# Patient Record
Sex: MALE | Race: WHITE | NOT HISPANIC OR LATINO | Employment: FULL TIME | URBAN - METROPOLITAN AREA
[De-identification: names, ages, dates, MRNs, and addresses within clinical notes are randomized per-mention and may not be internally consistent; named-entity substitution may affect disease eponyms.]

---

## 2018-03-21 ENCOUNTER — OFFICE VISIT (OUTPATIENT)
Dept: FAMILY MEDICINE CLINIC | Facility: CLINIC | Age: 63
End: 2018-03-21

## 2018-03-21 VITALS
OXYGEN SATURATION: 99 % | HEIGHT: 70 IN | BODY MASS INDEX: 33.93 KG/M2 | SYSTOLIC BLOOD PRESSURE: 120 MMHG | WEIGHT: 237 LBS | HEART RATE: 71 BPM | DIASTOLIC BLOOD PRESSURE: 82 MMHG

## 2018-03-21 DIAGNOSIS — Z00.00 HEALTHCARE MAINTENANCE: ICD-10-CM

## 2018-03-21 DIAGNOSIS — Z91.89 AT HIGH RISK FOR INFECTION: ICD-10-CM

## 2018-03-21 DIAGNOSIS — R31.21 ASYMPTOMATIC MICROSCOPIC HEMATURIA: ICD-10-CM

## 2018-03-21 DIAGNOSIS — Z00.00 HEALTHCARE MAINTENANCE: Primary | ICD-10-CM

## 2018-03-21 PROBLEM — K63.5 BENIGN COLON POLYP: Status: ACTIVE | Noted: 2018-03-21

## 2018-03-21 PROBLEM — G47.33 OBSTRUCTIVE SLEEP APNEA ON CPAP: Status: ACTIVE | Noted: 2018-03-21

## 2018-03-21 PROBLEM — R09.89 BRUIT OF LEFT CAROTID ARTERY: Status: ACTIVE | Noted: 2018-03-21

## 2018-03-21 PROBLEM — K21.9 CHRONIC GERD: Status: ACTIVE | Noted: 2018-03-21

## 2018-03-21 PROBLEM — Z99.89 OBSTRUCTIVE SLEEP APNEA ON CPAP: Status: ACTIVE | Noted: 2018-03-21

## 2018-03-21 PROCEDURE — 99386 PREV VISIT NEW AGE 40-64: CPT | Performed by: FAMILY MEDICINE

## 2018-03-21 NOTE — PROGRESS NOTES
Subjective   Hiram Echevarria is a 62 y.o. male.     Comes in to establish care for preventive health maintenance.  A significant orthopedic surgical history with knee back bilateral carpal tunnel and shoulder surgeries.  No acute complaints.  Review of his records following visit showed diagnosis of DOREEN using CPAP.  Family history of stroke.  Chronic microscopic hematuria with a negative  workup.  Does like periodic PSA screenings.  Had a colonoscopy in 2013 with benign polyp and due for repeat in December.  Tetanus is up-to-date.  Doesn't look like he's been screened for hepatitis C.           The following portions of the patient's history were reviewed and updated as appropriate: allergies, current medications, past family history, past medical history, past social history, past surgical history and problem list.    Review of Systems   Constitutional: Negative.  Negative for chills and fever.   HENT: Negative.  Negative for congestion, rhinorrhea and sore throat.    Eyes: Negative.  Negative for discharge and visual disturbance.   Respiratory: Negative.  Negative for cough and shortness of breath.    Cardiovascular: Negative.  Negative for chest pain.   Gastrointestinal: Negative.  Negative for abdominal pain, constipation, diarrhea, nausea and vomiting.   Endocrine: Negative.  Negative for polydipsia.   Genitourinary: Negative.  Negative for dysuria and hematuria.   Musculoskeletal: Negative.  Negative for arthralgias and myalgias.   Skin: Negative.  Negative for rash.   Allergic/Immunologic: Negative.    Neurological: Negative.  Negative for weakness, numbness and headaches.   Hematological: Negative.  Does not bruise/bleed easily.   Psychiatric/Behavioral: Negative.  Negative for dysphoric mood. The patient is not nervous/anxious.    All other systems reviewed and are negative.        Objective   Physical Exam   Constitutional: He is oriented to person, place, and time. He appears well-developed and  well-nourished.   HENT:   Head: Normocephalic and atraumatic.   Right Ear: External ear normal.   Left Ear: External ear normal.   Mouth/Throat: No oropharyngeal exudate.   Eyes: Conjunctivae, EOM and lids are normal. Pupils are equal, round, and reactive to light. Right eye exhibits no discharge. Left eye exhibits no discharge. No scleral icterus.   Neck: Trachea normal, normal range of motion and phonation normal. Neck supple. Carotid bruit is not present. No thyromegaly present.   Cardiovascular: Normal rate, regular rhythm and normal heart sounds.  Exam reveals no gallop and no friction rub.    No murmur heard.  No peripheral edema   Pulmonary/Chest: Effort normal and breath sounds normal. No stridor. He has no wheezes. He has no rales.   Abdominal: Soft. He exhibits no distension. There is no tenderness.   Musculoskeletal: Normal range of motion. He exhibits no edema.   Lymphadenopathy:     He has no cervical adenopathy.   Neurological: He is alert and oriented to person, place, and time. He has normal strength. No cranial nerve deficit.   Skin: Skin is warm, dry and intact. No petechiae and no rash noted. No cyanosis. Nails show no clubbing.   Psychiatric: He has a normal mood and affect. His speech is normal and behavior is normal. Judgment and thought content normal. Cognition and memory are normal.   Nursing note and vitals reviewed.    Available records in Epic are reviewed:  Colonoscopy confirmed, but profiles found with LDLs ranging near 100, street of DOREEN on CPAP which needs to be reviewed with patient; tetanus is current.    Assessment/Plan   Hiram was seen today for establish care.    Diagnoses and all orders for this visit:    Healthcare maintenance  -     CBC & Differential; Future  -     Comprehensive Metabolic Panel; Future  -     Lipid Panel; Future  -     PSA Screen; Future    Asymptomatic microscopic hematuria  -     Comprehensive Metabolic Panel; Future  -     Urinalysis With / Microscopic If  Indicated - Urine, Clean Catch; Future    At high risk for infection  -     CBC & Differential; Future  -     Hepatitis C Antibody; Future      Annual follow-up.  Review ten-year cardiovascular risk status with him and labs are available.  Need to discuss DOREEN

## 2018-03-21 NOTE — PATIENT INSTRUCTIONS
Read YOUNGER NEXT YEAR    See if you can get records of colonoscopy    We will communicate with you about labs    I would strongly encourage you to sign up for My Chart using the access code provided with these papers.  This provides an excellent convenient way for you to communicate with us and with [facility].  Lab and x-ray reports can be viewed, prescription refills and appointments may be requested, and you may send emails to your physician's office.      Keep working on the diet and exercise

## 2018-03-24 LAB
ALBUMIN SERPL-MCNC: 4.7 G/DL (ref 3.5–5.2)
ALBUMIN/GLOB SERPL: 2 G/DL
ALP SERPL-CCNC: 82 U/L (ref 39–117)
ALT SERPL-CCNC: 38 U/L (ref 1–41)
APPEARANCE UR: CLEAR
AST SERPL-CCNC: 41 U/L (ref 1–40)
BACTERIA #/AREA URNS HPF: ABNORMAL /HPF
BASOPHILS # BLD AUTO: 0.03 10*3/MM3 (ref 0–0.2)
BASOPHILS NFR BLD AUTO: 0.9 % (ref 0–1.5)
BILIRUB SERPL-MCNC: 1.1 MG/DL (ref 0.1–1.2)
BILIRUB UR QL STRIP: NEGATIVE
BUN SERPL-MCNC: 18 MG/DL (ref 8–23)
BUN/CREAT SERPL: 16.5 (ref 7–25)
CALCIUM SERPL-MCNC: 9.6 MG/DL (ref 8.6–10.5)
CASTS URNS MICRO: ABNORMAL
CHLORIDE SERPL-SCNC: 103 MMOL/L (ref 98–107)
CHOLEST SERPL-MCNC: 150 MG/DL (ref 0–200)
CO2 SERPL-SCNC: 26.6 MMOL/L (ref 22–29)
COLOR UR: YELLOW
CREAT SERPL-MCNC: 1.09 MG/DL (ref 0.76–1.27)
EOSINOPHIL # BLD AUTO: 0.11 10*3/MM3 (ref 0–0.7)
EOSINOPHIL NFR BLD AUTO: 3.1 % (ref 0.3–6.2)
EPI CELLS #/AREA URNS HPF: ABNORMAL /HPF
ERYTHROCYTE [DISTWIDTH] IN BLOOD BY AUTOMATED COUNT: 12.8 % (ref 11.5–14.5)
GFR SERPLBLD CREATININE-BSD FMLA CKD-EPI: 69 ML/MIN/1.73
GFR SERPLBLD CREATININE-BSD FMLA CKD-EPI: 84 ML/MIN/1.73
GLOBULIN SER CALC-MCNC: 2.3 GM/DL
GLUCOSE SERPL-MCNC: 103 MG/DL (ref 65–99)
GLUCOSE UR QL: NEGATIVE
HCT VFR BLD AUTO: 43.1 % (ref 40.4–52.2)
HCV AB S/CO SERPL IA: 0.2 S/CO RATIO (ref 0–0.9)
HDLC SERPL-MCNC: 40 MG/DL (ref 40–60)
HGB BLD-MCNC: 14.1 G/DL (ref 13.7–17.6)
HGB UR QL STRIP: ABNORMAL
IMM GRANULOCYTES # BLD: 0 10*3/MM3 (ref 0–0.03)
IMM GRANULOCYTES NFR BLD: 0 % (ref 0–0.5)
KETONES UR QL STRIP: NEGATIVE
LDLC SERPL CALC-MCNC: 98 MG/DL (ref 0–100)
LEUKOCYTE ESTERASE UR QL STRIP: NEGATIVE
LYMPHOCYTES # BLD AUTO: 0.94 10*3/MM3 (ref 0.9–4.8)
LYMPHOCYTES NFR BLD AUTO: 26.8 % (ref 19.6–45.3)
MCH RBC QN AUTO: 32.2 PG (ref 27–32.7)
MCHC RBC AUTO-ENTMCNC: 32.7 G/DL (ref 32.6–36.4)
MCV RBC AUTO: 98.4 FL (ref 79.8–96.2)
MONOCYTES # BLD AUTO: 0.42 10*3/MM3 (ref 0.2–1.2)
MONOCYTES NFR BLD AUTO: 12 % (ref 5–12)
NEUTROPHILS # BLD AUTO: 2.01 10*3/MM3 (ref 1.9–8.1)
NEUTROPHILS NFR BLD AUTO: 57.2 % (ref 42.7–76)
NITRITE UR QL STRIP: NEGATIVE
PH UR STRIP: 7 [PH] (ref 5–8)
PLATELET # BLD AUTO: 208 10*3/MM3 (ref 140–500)
POTASSIUM SERPL-SCNC: 4.7 MMOL/L (ref 3.5–5.2)
PROT SERPL-MCNC: 7 G/DL (ref 6–8.5)
PROT UR QL STRIP: NEGATIVE
PSA SERPL-MCNC: 0.47 NG/ML (ref 0–4)
RBC # BLD AUTO: 4.38 10*6/MM3 (ref 4.6–6)
RBC #/AREA URNS HPF: ABNORMAL /HPF
SODIUM SERPL-SCNC: 141 MMOL/L (ref 136–145)
SP GR UR: 1.02 (ref 1–1.03)
TRIGL SERPL-MCNC: 58 MG/DL (ref 0–150)
UROBILINOGEN UR STRIP-MCNC: ABNORMAL MG/DL
VLDLC SERPL CALC-MCNC: 11.6 MG/DL (ref 5–40)
WBC # BLD AUTO: 3.51 10*3/MM3 (ref 4.5–10.7)
WBC #/AREA URNS HPF: ABNORMAL /HPF

## 2018-03-28 DIAGNOSIS — R73.9 HYPERGLYCEMIA: ICD-10-CM

## 2018-03-28 DIAGNOSIS — D70.9 NEUTROPENIA, UNSPECIFIED TYPE (HCC): Primary | ICD-10-CM

## 2018-07-16 ENCOUNTER — TRANSCRIBE ORDERS (OUTPATIENT)
Dept: ADMINISTRATIVE | Facility: HOSPITAL | Age: 63
End: 2018-07-16

## 2018-07-16 DIAGNOSIS — M25.562 LEFT KNEE PAIN, UNSPECIFIED CHRONICITY: Primary | ICD-10-CM

## 2018-07-20 ENCOUNTER — HOSPITAL ENCOUNTER (OUTPATIENT)
Dept: MRI IMAGING | Facility: HOSPITAL | Age: 63
Discharge: HOME | End: 2018-07-20
Attending: ORTHOPAEDIC SURGERY | Admitting: ORTHOPAEDIC SURGERY

## 2018-07-20 DIAGNOSIS — M25.562 LEFT KNEE PAIN, UNSPECIFIED CHRONICITY: ICD-10-CM

## 2018-07-20 PROCEDURE — 73721 MRI JNT OF LWR EXTRE W/O DYE: CPT

## 2018-07-24 ENCOUNTER — TRANSCRIBE ORDERS (OUTPATIENT)
Dept: PHYSICAL THERAPY | Facility: CLINIC | Age: 63
End: 2018-07-24

## 2018-07-24 DIAGNOSIS — M25.562 ACUTE PAIN OF LEFT KNEE: Primary | ICD-10-CM

## 2018-08-10 ENCOUNTER — TREATMENT (OUTPATIENT)
Dept: PHYSICAL THERAPY | Facility: CLINIC | Age: 63
End: 2018-08-10

## 2018-08-10 DIAGNOSIS — Z09 SURGERY FOLLOW-UP: ICD-10-CM

## 2018-08-10 DIAGNOSIS — M17.12 PRIMARY OSTEOARTHRITIS OF LEFT KNEE: ICD-10-CM

## 2018-08-10 DIAGNOSIS — M25.562 ACUTE PAIN OF LEFT KNEE: Primary | ICD-10-CM

## 2018-08-10 PROCEDURE — 97110 THERAPEUTIC EXERCISES: CPT | Performed by: PHYSICAL THERAPIST

## 2018-08-10 PROCEDURE — G8979 MOBILITY GOAL STATUS: HCPCS | Performed by: PHYSICAL THERAPIST

## 2018-08-10 PROCEDURE — G8978 MOBILITY CURRENT STATUS: HCPCS | Performed by: PHYSICAL THERAPIST

## 2018-08-10 PROCEDURE — 97161 PT EVAL LOW COMPLEX 20 MIN: CPT | Performed by: PHYSICAL THERAPIST

## 2018-08-10 PROCEDURE — 97014 ELECTRIC STIMULATION THERAPY: CPT | Performed by: PHYSICAL THERAPIST

## 2018-08-10 NOTE — PROGRESS NOTES
Physical Therapy Initial Evaluation and Plan of Care    Patient: Hiram Echevarria Jr.   : 1955  Diagnosis/ICD-10 Code:  Acute pain of left knee [M25.562]  Referring practitioner: Chuck Merrill MD  Date of Initial Visit: 8/10/2018  Today's Date: 8/10/2018  Patient seen for 1 sessions           Subjective Questionnaire: LEFS: 54/80      Subjective Evaluation    History of Present Illness  Date of surgery: 8/3/2018  Mechanism of injury: Pt goes by Kuldeep. L knee menisectomy 8/3/18. Pt reports knee pain began about a year ago when getting off plane. Had several cortisone injections prior to surgery. No previous PT.   Currently, knee is sore. Has been back to work since this past Monday. Works manual labor. Has not taken pain medication since Monday.   No disturbances in sleep because of knee.  Pt reports mild pain with stairs, getting in/out of car, and prolonged standing.  Using ice daily.  Pt walks and works around the house to stay active.  Pt reports he injured knee as a child (no significant findings). No other injuries or surgeries.  Hx of R shoulder replacement, R knee scope, and lumbar disectomy.      Patient Occupation: [employer] Pain  Current pain ratin  At worst pain ratin  Location: L inferior patella  Quality: dull ache and sharp  Relieving factors: ice  Aggravating factors: squatting, ambulation, stairs and standing  Progression: improved    Social Support  Lives in: multiple-level home  Lives with: spouse    Diagnostic Tests  Abnormal MRI: pre-op.    Treatments  Previous treatment: injection treatment  Current treatment: physical therapy  Patient Goals  Patient goals for therapy: decreased pain, increased strength, independence with ADLs/IADLs and return to work             Objective       Tenderness   Left Knee   Tenderness in the inferior patella, lateral joint line and medial joint line.     Active Range of Motion   Left Knee   Flexion: 100 degrees   Extension: 5 degrees      Strength/Myotome Testing     Left Knee   Flexion: 5  Extension: 5  Quadriceps contraction: fair    Ambulation     Observational Gait   Gait: antalgic          Assessment & Plan     Assessment  Impairments: abnormal gait, abnormal muscle tone, abnormal or restricted ROM, activity intolerance, impaired balance, impaired physical strength, lacks appropriate home exercise program, pain with function and weight-bearing intolerance  Assessment details: Pt will benefit from skilled PT services in order to address listed impairments and increase tolerance to normal daily activities including ADLs, work, and recreational activities.    Prognosis: good  Functional Limitations: lifting, walking, uncomfortable because of pain, moving in bed, sitting, standing and stooping  Goals  Plan Goals: Plan Goals: Short Term Goals: 2 weeks. Patient will:  1. Be independent with initial HEP  2. Be instructed in posture and body mechanics  3. Tolerate CKC knee strengthening w/o significant increase in pain    Long Term Goals: 4-8 weeks. Patient will:  1. Ambulate 100ft w/o significant pain, limitation, or deviation in gait mechanics  2. Ascend/descend 4 stairs using reciprocal steps and 1 rail safely  3. Perform body weight squat w/o significant knee valgus, demonstrating adequate strength for ADLs  4. Demonstrate normal lower extremity flexibility (0-110deg) to allow for walking/ADL's/performance of household tasks without pain.  5. Perform SL balance >5sec demonstrating balance required for navigating uneven terrain  6. Perceived disability </= 15% as measured on LEFS  7. Be independent with long term HEP    Plan  Therapy options: will be seen for skilled physical therapy services  Planned modality interventions: cryotherapy, microcurrent electrical stimulation, ultrasound and thermotherapy (hydrocollator packs)  Planned therapy interventions: abdominal trunk stabilization, balance/weight-bearing training, body mechanics training, ADL  retraining, flexibility, functional ROM exercises, gait training, home exercise program, joint mobilization, manual therapy, neuromuscular re-education, soft tissue mobilization, strengthening, stretching and therapeutic activities  Frequency: 2x week  Duration in weeks: 8  Treatment plan discussed with: patient        Manual Therapy:    0     mins  61095;  Therapeutic Exercise:    15     mins  78811;     Neuromuscular Rebeca:    0    mins  38251;    Therapeutic Activity:     0     mins  19372;     Gait Trainin     mins  97889;     Ultrasound:     0     mins  01251;    Electrical Stimulation:    15     mins  70168 ( );  Dry Needling     0     mins self-pay    Timed Treatment:   15   mins   Total Treatment:     60   mins    PT SIGNATURE: Norma Shore PT   DATE TREATMENT INITIATED: 8/10/2018    Initial Certification  Certification Period: 2018  I certify that the therapy services are furnished while this patient is under my care.  The services outlined above are required by this patient, and will be reviewed every 90 days.     PHYSICIAN: Chuck Merrill MD      DATE:     Please sign and return via fax to [phone].. Thank you, [facility].

## 2018-08-13 ENCOUNTER — OFFICE VISIT (OUTPATIENT)
Dept: PHYSICAL THERAPY | Facility: CLINIC | Age: 63
End: 2018-08-13

## 2018-08-13 DIAGNOSIS — Z09 SURGERY FOLLOW-UP: ICD-10-CM

## 2018-08-13 DIAGNOSIS — M25.562 ACUTE PAIN OF LEFT KNEE: Primary | ICD-10-CM

## 2018-08-13 DIAGNOSIS — M17.12 PRIMARY OSTEOARTHRITIS OF LEFT KNEE: ICD-10-CM

## 2018-08-13 PROCEDURE — 97014 ELECTRIC STIMULATION THERAPY: CPT | Performed by: PHYSICAL THERAPIST

## 2018-08-13 PROCEDURE — 97530 THERAPEUTIC ACTIVITIES: CPT | Performed by: PHYSICAL THERAPIST

## 2018-08-13 PROCEDURE — 97110 THERAPEUTIC EXERCISES: CPT | Performed by: PHYSICAL THERAPIST

## 2018-08-13 NOTE — PROGRESS NOTES
Physical Therapy Daily Progress Note    Time In 1520  Time Out 1610    Hiram Echevarria reports: just saw MD who thought left knee was doing well. Told to continue PT and only f/u with MD if problems occurred.  Reports up on left leg for prolonged time yesterday, so did have some increased discomfort but felt better with ice application.    Subjective     Objective   See Exercise, Manual, and Modality Logs for complete treatment.   Exercise rationale/ pain free exercise performance  Anatomy and structure of affected musculature  Sleeping positions with pillows  Alternate exercise positions  Verbal/Tactile cues to ensure correct exercise performance/technique  Encouraged ice application(frequent and often).    Added: 10 oclock slr, LAQ and seated hamstring curl with t band      Assessment/Plan  Subjectively reports improvement of left knee post surgical intervention as well as noting ability to return to more upright activities, though still limited with prolonged tasks(standing/walking) due to swelling/inflamation.  Able to progress strengthening exercise of affected musculature.   Will benefit from continued intervention and exercise progression to increase mm strength and function in order to allow for full return to prior ADL and functional activity.    Progress strengthening /stabilization /functional activity for L LE           Manual Therapy:         mins  52864;  Therapeutic Exercise:    15     mins  11360;     Neuromuscular Rebeca:        mins  32368;    Therapeutic Activity:      15    mins  42495;     Gait Training:           mins  39175;     Ultrasound:          mins  80190;    Electrical Stimulation:   15     mins  08613 ( );      Timed Treatment:  30    mins   Total Treatment:    45    mins    Waylon Tucker PTA    Physical Therapist Assistant [place] [phone]

## 2018-08-20 ENCOUNTER — TREATMENT (OUTPATIENT)
Dept: PHYSICAL THERAPY | Facility: CLINIC | Age: 63
End: 2018-08-20

## 2018-08-20 DIAGNOSIS — M17.12 PRIMARY OSTEOARTHRITIS OF LEFT KNEE: ICD-10-CM

## 2018-08-20 DIAGNOSIS — M25.562 ACUTE PAIN OF LEFT KNEE: Primary | ICD-10-CM

## 2018-08-20 DIAGNOSIS — Z09 SURGERY FOLLOW-UP: ICD-10-CM

## 2018-08-20 PROCEDURE — 97110 THERAPEUTIC EXERCISES: CPT | Performed by: PHYSICAL THERAPIST

## 2018-08-20 NOTE — PROGRESS NOTES
"Physical Therapy Daily Progress Note    Subjective   Pt reports he was weeding yesterday, stepped the wrong way, and \"jammed knee upwards\". (Demonstrates knee flexion.) Knee hurt the rest of the day. He's been on his feet most of today. Knee is still better than it was prior to surgery.    Objective   See Exercise, Manual, and Modality Logs for complete treatment.       Assessment/Plan  Required verbal cues to maintain TKE w/ SLR. Tolerated exercises well. Deferred e-stim due to clothing. Progress per POC.                 Manual Therapy:    0     mins  46468;  Therapeutic Exercise:    40     mins  46679;     Neuromuscular Rebeca:    0    mins  91938;    Therapeutic Activity:     0     mins  45564;     Gait Trainin     mins  57222;     Ultrasound:     0     mins  05471;    Work Hardening           0      mins 13843  Iontophoresis               0   mins 69987    Timed Treatment:   40   mins   Total Treatment:     55   mins    Norma Shore, PT  Physical Therapist  "

## 2018-09-17 ENCOUNTER — RESULTS ENCOUNTER (OUTPATIENT)
Dept: FAMILY MEDICINE CLINIC | Facility: CLINIC | Age: 63
End: 2018-09-17

## 2018-09-17 DIAGNOSIS — R73.9 HYPERGLYCEMIA: ICD-10-CM

## 2018-09-17 DIAGNOSIS — D70.9 NEUTROPENIA, UNSPECIFIED TYPE (HCC): ICD-10-CM

## 2018-09-20 ENCOUNTER — PREP FOR SURGERY (OUTPATIENT)
Dept: OTHER | Facility: HOSPITAL | Age: 63
End: 2018-09-20

## 2018-09-20 DIAGNOSIS — Z86.010 HISTORY OF COLON POLYPS: Primary | ICD-10-CM

## 2018-09-20 PROBLEM — Z86.0100 HISTORY OF COLON POLYPS: Status: ACTIVE | Noted: 2018-09-20

## 2018-12-05 RX ORDER — OMEPRAZOLE 20 MG/1
20 CAPSULE, DELAYED RELEASE ORAL DAILY
COMMUNITY
End: 2020-07-14

## 2018-12-06 ENCOUNTER — HOSPITAL ENCOUNTER (OUTPATIENT)
Facility: HOSPITAL | Age: 63
Setting detail: HOSPITAL OUTPATIENT SURGERY
Discharge: HOME | End: 2018-12-06
Attending: COLON & RECTAL SURGERY | Admitting: COLON & RECTAL SURGERY

## 2018-12-06 ENCOUNTER — ANESTHESIA EVENT (OUTPATIENT)
Dept: GASTROENTEROLOGY | Facility: HOSPITAL | Age: 63
End: 2018-12-06

## 2018-12-06 ENCOUNTER — ANESTHESIA (OUTPATIENT)
Dept: GASTROENTEROLOGY | Facility: HOSPITAL | Age: 63
End: 2018-12-06

## 2018-12-06 VITALS
HEART RATE: 74 BPM | HEIGHT: 70 IN | OXYGEN SATURATION: 99 % | SYSTOLIC BLOOD PRESSURE: 144 MMHG | RESPIRATION RATE: 16 BRPM | TEMPERATURE: 97.9 F | BODY MASS INDEX: 31.92 KG/M2 | DIASTOLIC BLOOD PRESSURE: 86 MMHG | WEIGHT: 223 LBS

## 2018-12-06 DIAGNOSIS — Z86.010 HISTORY OF COLON POLYPS: ICD-10-CM

## 2018-12-06 PROCEDURE — 45380 COLONOSCOPY AND BIOPSY: CPT | Performed by: COLON & RECTAL SURGERY

## 2018-12-06 PROCEDURE — 25010000002 PROPOFOL 10 MG/ML EMULSION: Performed by: ANESTHESIOLOGY

## 2018-12-06 PROCEDURE — 88305 TISSUE EXAM BY PATHOLOGIST: CPT | Performed by: COLON & RECTAL SURGERY

## 2018-12-06 RX ORDER — PROPOFOL 10 MG/ML
VIAL (ML) INTRAVENOUS AS NEEDED
Status: DISCONTINUED | OUTPATIENT
Start: 2018-12-06 | End: 2018-12-06 | Stop reason: SURG

## 2018-12-06 RX ORDER — SODIUM CHLORIDE, SODIUM LACTATE, POTASSIUM CHLORIDE, CALCIUM CHLORIDE 600; 310; 30; 20 MG/100ML; MG/100ML; MG/100ML; MG/100ML
30 INJECTION, SOLUTION INTRAVENOUS CONTINUOUS PRN
Status: DISCONTINUED | OUTPATIENT
Start: 2018-12-06 | End: 2018-12-06 | Stop reason: HOSPADM

## 2018-12-06 RX ORDER — LIDOCAINE HYDROCHLORIDE 20 MG/ML
INJECTION, SOLUTION INFILTRATION; PERINEURAL AS NEEDED
Status: DISCONTINUED | OUTPATIENT
Start: 2018-12-06 | End: 2018-12-06 | Stop reason: SURG

## 2018-12-06 RX ORDER — SODIUM CHLORIDE 0.9 % (FLUSH) 0.9 %
3 SYRINGE (ML) INJECTION EVERY 12 HOURS SCHEDULED
Status: DISCONTINUED | OUTPATIENT
Start: 2018-12-06 | End: 2018-12-06 | Stop reason: HOSPADM

## 2018-12-06 RX ORDER — SODIUM CHLORIDE 0.9 % (FLUSH) 0.9 %
3-10 SYRINGE (ML) INJECTION AS NEEDED
Status: DISCONTINUED | OUTPATIENT
Start: 2018-12-06 | End: 2018-12-06 | Stop reason: HOSPADM

## 2018-12-06 RX ORDER — PROPOFOL 10 MG/ML
VIAL (ML) INTRAVENOUS CONTINUOUS PRN
Status: DISCONTINUED | OUTPATIENT
Start: 2018-12-06 | End: 2018-12-06 | Stop reason: SURG

## 2018-12-06 RX ADMIN — LIDOCAINE HYDROCHLORIDE 50 MG: 20 INJECTION, SOLUTION INFILTRATION; PERINEURAL at 12:47

## 2018-12-06 RX ADMIN — SODIUM CHLORIDE, POTASSIUM CHLORIDE, SODIUM LACTATE AND CALCIUM CHLORIDE 30 ML/HR: 600; 310; 30; 20 INJECTION, SOLUTION INTRAVENOUS at 12:11

## 2018-12-06 RX ADMIN — PROPOFOL 125 MCG/KG/MIN: 10 INJECTION, EMULSION INTRAVENOUS at 12:48

## 2018-12-06 RX ADMIN — PROPOFOL 75 MG: 10 INJECTION, EMULSION INTRAVENOUS at 12:48

## 2018-12-06 NOTE — H&P
Hiram Echevarria Jr. is a 62 y.o. male  who is referred by Scarlet King MD for a colonoscopy. He is an  has a history of previous adenomatous polyp.     He denies any change in bowel function, melena, or hematochezia.    Past Medical History:   Diagnosis Date   • Asymptomatic microscopic hematuria 03/2018    negative  workkup   • Bruit     LEFT CAROTID ARTERY   • Colon polyps    • GERD (gastroesophageal reflux disease)    • Hyperglycemia    • Laceration of finger 05/16/2016    2ND FINGER, RIGHT HAND   • Laceration of finger 09/03/2012    LEFT THUMB, SEEN AT [facility]   • Neuropathy    • Neutropenia (CMS/HCC)    • OA (osteoarthritis)    • DOREEN (obstructive sleep apnea)     USES CPAP   • Paronychia of finger, right 05/2016    2ND FINGER   • PONV (postoperative nausea and vomiting)    • Right cataract    • Snoring    • Wears glasses        Past Surgical History:   Procedure Laterality Date   • CARPAL TUNNEL RELEASE Left 1998   • CARPAL TUNNEL RELEASE Right 08/27/1999    DR. EDVIN ANDRADE AT [place]   • COLONOSCOPY W/ POLYPECTOMY N/A 12/18/2013    5 MM ADENOMATOUS POLYP IN TRANSVERSE, DIVERTICULOSIS IN SIGMOID, RESCOPE IN 5 YRS, DR. SCARLET KING AT [facility]   • KNEE ARTHROSCOPY Right 06/21/2005    DR. TARUN JURADO AT [place]   • KNEE MENISCAL REPAIR Left 08/13/2018    DR. SERGIO ANDREW   • LUMBAR DISC SURGERY Left 2002    L5-S1    • TONSILLECTOMY Bilateral    • TOTAL SHOULDER ARTHROPLASTY Right 09/30/2014    DR. SERGIO ANDREW AT [place]       Medications Prior to Admission   Medication Sig Dispense Refill Last Dose   • omeprazole (priLOSEC) 20 MG capsule Take 20 mg by mouth Daily.   Past Week at Unknown time       No Known Allergies    Family History   Problem Relation Age of Onset   • Hypertension Mother    • Diabetes Mother    • Hypertension Father    • Diabetes Father    • Stroke Father    • Heart disease Father    • Colon cancer Neg Hx    • Prostate cancer Neg Hx        Social History     Socioeconomic History   • Marital  status:      Spouse name: Not on file   • Number of children: Not on file   • Years of education: Not on file   • Highest education level: Not on file   Social Needs   • Financial resource strain: Not on file   • Food insecurity - worry: Not on file   • Food insecurity - inability: Not on file   • Transportation needs - medical: Not on file   • Transportation needs - non-medical: Not on file   Occupational History   • Not on file   Tobacco Use   • Smoking status: Never Smoker   • Smokeless tobacco: Former User     Types: Chew   Substance and Sexual Activity   • Alcohol use: Yes     Alcohol/week: 2.4 oz     Types: 4 Shots of liquor per week   • Drug use: No   • Sexual activity: Defer   Other Topics Concern   • Not on file   Social History Narrative   • Not on file       Review of Systems   Gastrointestinal: Negative for abdominal pain, nausea and vomiting.   All other systems reviewed and are negative.      Vitals:    12/06/18 1205   BP: 140/84   Pulse: 81   Resp: 16   Temp: 97.9 °F (36.6 °C)   SpO2: 97%         Physical Exam   Constitutional: He is oriented to person, place, and time. He appears well-developed and well-nourished.   HENT:   Head: Normocephalic and atraumatic.   Eyes: EOM are normal. Pupils are equal, round, and reactive to light.   Cardiovascular: Regular rhythm.   Pulmonary/Chest: Effort normal.   Abdominal: Soft. He exhibits no distension.   Musculoskeletal: Normal range of motion.   Neurological: He is alert and oriented to person, place, and time.   Skin: Skin is warm and dry.   Psychiatric: Judgment and thought content normal.         Assessment/Plan      previous adenomatous polyp.         I recommend colonoscopy.  I described risks, benefits of the procedure with the patient including but not limited to bleeding, infection, possibility of perforation and possible polypectomy. All of the patient's questions were answered and they would like to proceed with the above  recommendations.

## 2018-12-06 NOTE — DISCHARGE INSTRUCTIONS
For the next 24 hours patient needs to be with a responsible adult.    For 24 hours DO NOT drive, operate machinery, appliances, drink alcohol, make important decisions or sign legal documents.    Start with a light or bland diet and advance to regular diet as tolerated.    Follow recommendations on procedure report provided by your doctor.    Call Dr. King for problems [phone]    Problems may include but not limited to: large amounts of bleeding, trouble breathing, repeated vomiting, severe unrelieved pain, fever or chills.

## 2018-12-06 NOTE — ANESTHESIA POSTPROCEDURE EVALUATION
Patient: Hiram Echevarria Jr.    Procedure Summary     Date:  12/06/18 Room / Location:  [facility] / [facility]    Anesthesia Start:  1245 Anesthesia Stop:  1307    Procedure:  COLONOSCOPY INTO CECUM WITH POLYPECTOMY X 3 (N/A ) Diagnosis:       History of colon polyps      (History of colon polyps [Z86.010])    Surgeon:  Sandra King MD Provider:  Syed Shepherd MD    Anesthesia Type:  MAC ASA Status:  2          Anesthesia Type: MAC  Last vitals  BP   144/86 (12/06/18 1326)   Temp   36.6 °C (97.9 °F) (12/06/18 1205)   Pulse   74 (12/06/18 1326)   Resp   16 (12/06/18 1326)     SpO2   99 % (12/06/18 1326)     Post Anesthesia Care and Evaluation    Patient location during evaluation: PACU  Patient participation: complete - patient participated  Level of consciousness: awake  Pain score: 1  Pain management: adequate  Airway patency: patent  Anesthetic complications: No anesthetic complications  PONV Status: none  Cardiovascular status: acceptable  Respiratory status: acceptable  Hydration status: acceptable

## 2018-12-06 NOTE — ANESTHESIA PREPROCEDURE EVALUATION
Anesthesia Evaluation     Patient summary reviewed   history of anesthetic complications: PONV               Airway   No difficulty expected  Dental      Pulmonary    (+) sleep apnea,   Cardiovascular     Rhythm: regular        Neuro/Psych  GI/Hepatic/Renal/Endo      Musculoskeletal     Abdominal    Substance History      OB/GYN          Other                        Anesthesia Plan    ASA 2     MAC     Anesthetic plan, all risks, benefits, and alternatives have been provided, discussed and informed consent has been obtained with: patient.

## 2018-12-07 LAB
CYTO UR: NORMAL
LAB AP CASE REPORT: NORMAL
PATH REPORT.FINAL DX SPEC: NORMAL
PATH REPORT.GROSS SPEC: NORMAL

## 2018-12-12 ENCOUNTER — OFFICE VISIT (OUTPATIENT)
Dept: FAMILY MEDICINE CLINIC | Facility: CLINIC | Age: 63
End: 2018-12-12

## 2018-12-12 VITALS
DIASTOLIC BLOOD PRESSURE: 88 MMHG | RESPIRATION RATE: 14 BRPM | HEIGHT: 70 IN | HEART RATE: 68 BPM | WEIGHT: 227.8 LBS | SYSTOLIC BLOOD PRESSURE: 134 MMHG | BODY MASS INDEX: 32.61 KG/M2 | TEMPERATURE: 98.2 F | OXYGEN SATURATION: 98 %

## 2018-12-12 DIAGNOSIS — R74.8 ABNORMAL LIVER ENZYMES: ICD-10-CM

## 2018-12-12 DIAGNOSIS — Z23 NEED FOR VACCINATION: ICD-10-CM

## 2018-12-12 DIAGNOSIS — R73.9 HYPERGLYCEMIA: Primary | ICD-10-CM

## 2018-12-12 DIAGNOSIS — D70.9 NEUTROPENIA, UNSPECIFIED TYPE (HCC): ICD-10-CM

## 2018-12-12 PROCEDURE — 99213 OFFICE O/P EST LOW 20 MIN: CPT | Performed by: FAMILY MEDICINE

## 2018-12-12 NOTE — PROGRESS NOTES
Subjective   Hiram Echevarria Jr. is a 63 y.o. male.     Follow-up of laboratory issues.  Generally feeling well.  Does not want a flu shot.  His left knee operated on and has had some improvement in his symptoms.  Is adequately controlled with OTC Prilosec.  Colonoscopy recovered to tubular adenomas--supposed to be scoped again in 3 years.  Has no specific complaints otherwise.           The following portions of the patient's history were reviewed and updated as appropriate: allergies, current medications, past family history, past medical history, past social history, past surgical history and problem list.    Review of Systems   Constitutional: Negative for chills and fever.   HENT: Negative for congestion, rhinorrhea and sore throat.    Eyes: Negative for discharge and visual disturbance.   Respiratory: Negative for cough and shortness of breath.    Cardiovascular: Negative for chest pain.   Gastrointestinal: Negative for abdominal pain, constipation, diarrhea, nausea and vomiting.   Endocrine: Negative for polydipsia.   Genitourinary: Negative for dysuria and hematuria.   Musculoskeletal: Positive for arthralgias (left knee ). Negative for myalgias.   Skin: Negative for rash.   Allergic/Immunologic: Negative.    Neurological: Negative for weakness, numbness and headaches.   Hematological: Does not bruise/bleed easily.   Psychiatric/Behavioral: Negative for dysphoric mood. The patient is not nervous/anxious.    All other systems reviewed and are negative.      Objective   Physical Exam   Constitutional: He is oriented to person, place, and time. He appears well-developed and well-nourished.   HENT:   Head: Normocephalic and atraumatic.   Eyes: Conjunctivae and EOM are normal.   Neck: Normal range of motion.   Cardiovascular: Normal rate, regular rhythm and normal heart sounds.   Pulmonary/Chest: Effort normal and breath sounds normal.   Musculoskeletal: Normal range of motion.   Neurological: He is alert and  oriented to person, place, and time.   Skin: Skin is warm and dry.   Psychiatric: He has a normal mood and affect. His behavior is normal. Judgment and thought content normal.   Nursing note and vitals reviewed.    Discussed vaccinations.  He's agreeable hepatitis A here and will get on the list at pharmacy for shingles.  Discussed exercise    Assessment/Plan   Hiram was seen today for results.    Diagnoses and all orders for this visit:    Hyperglycemia  -     Hemoglobin A1c  -     Comprehensive Metabolic Panel    Neutropenia, unspecified type (CMS/HCC)  -     CBC & Differential    Abnormal liver enzymes  -     Comprehensive Metabolic Panel    Need for vaccination  -     Hepatitis A Vaccine Adult IM      Patient Instructions   Read YOUNGER NEXT YEAR or YOUNGER NEXT YEAR FOR WOMEN  I encourage 40 minutes of aerobic/cardio exercise 4 times weekly with 20 minute of resistance/strength training 4 times weekly --so one hour four days weekly to live longer healthier life.     Talk with your pharmacist about the new shingles vaccine (Shingrix).   I highly recommend this.      Schedule with James Epley     We gave first of two hepatitis A vaccines today; booster in 6 months      EMR Dragon/Transcription disclaimer:   Much of this encounter note is an electronic transcription/translation of spoken language to printed text. The electronic translation of spoken language may permit erroneous, or at times, nonsensical words or phrases to be inadvertently transcribed; Although I have reviewed the note for such errors, some may still exist. Please contact me with any questions or concerns about the conduct of this encounter note.

## 2018-12-12 NOTE — PATIENT INSTRUCTIONS
Read YOUNGER NEXT YEAR or YOUNGER NEXT YEAR FOR WOMEN  I encourage 40 minutes of aerobic/cardio exercise 4 times weekly with 20 minute of resistance/strength training 4 times weekly --so one hour four days weekly to live longer healthier life.     Talk with your pharmacist about the new shingles vaccine (Shingrix).   I highly recommend this.      Schedule with James Epley     We gave first of two hepatitis A vaccines today; booster in 6 months

## 2018-12-13 LAB
ALBUMIN SERPL-MCNC: 4.6 G/DL (ref 3.5–5.2)
ALBUMIN/GLOB SERPL: 1.8 G/DL
ALP SERPL-CCNC: 99 U/L (ref 39–117)
ALT SERPL-CCNC: 31 U/L (ref 1–41)
AST SERPL-CCNC: 28 U/L (ref 1–40)
BASOPHILS # BLD AUTO: 0.02 10*3/MM3 (ref 0–0.2)
BASOPHILS NFR BLD AUTO: 0.4 % (ref 0–1.5)
BILIRUB SERPL-MCNC: 1 MG/DL (ref 0.1–1.2)
BUN SERPL-MCNC: 13 MG/DL (ref 8–23)
BUN/CREAT SERPL: 12.7 (ref 7–25)
CALCIUM SERPL-MCNC: 9.5 MG/DL (ref 8.6–10.5)
CHLORIDE SERPL-SCNC: 101 MMOL/L (ref 98–107)
CO2 SERPL-SCNC: 28.9 MMOL/L (ref 22–29)
CREAT SERPL-MCNC: 1.02 MG/DL (ref 0.76–1.27)
EOSINOPHIL # BLD AUTO: 0.12 10*3/MM3 (ref 0–0.7)
EOSINOPHIL NFR BLD AUTO: 2.6 % (ref 0.3–6.2)
ERYTHROCYTE [DISTWIDTH] IN BLOOD BY AUTOMATED COUNT: 13.2 % (ref 11.5–14.5)
GLOBULIN SER CALC-MCNC: 2.5 GM/DL
GLUCOSE SERPL-MCNC: 92 MG/DL (ref 65–99)
HBA1C MFR BLD: 5.39 % (ref 4.8–5.6)
HCT VFR BLD AUTO: 44.9 % (ref 40.4–52.2)
HGB BLD-MCNC: 14.8 G/DL (ref 13.7–17.6)
IMM GRANULOCYTES # BLD: 0.03 10*3/MM3 (ref 0–0.03)
IMM GRANULOCYTES NFR BLD: 0.6 % (ref 0–0.5)
LYMPHOCYTES # BLD AUTO: 1.15 10*3/MM3 (ref 0.9–4.8)
LYMPHOCYTES NFR BLD AUTO: 24.6 % (ref 19.6–45.3)
MCH RBC QN AUTO: 32.6 PG (ref 27–32.7)
MCHC RBC AUTO-ENTMCNC: 33 G/DL (ref 32.6–36.4)
MCV RBC AUTO: 98.9 FL (ref 79.8–96.2)
MONOCYTES # BLD AUTO: 0.6 10*3/MM3 (ref 0.2–1.2)
MONOCYTES NFR BLD AUTO: 12.8 % (ref 5–12)
NEUTROPHILS # BLD AUTO: 2.75 10*3/MM3 (ref 1.9–8.1)
NEUTROPHILS NFR BLD AUTO: 59 % (ref 42.7–76)
PLATELET # BLD AUTO: 236 10*3/MM3 (ref 140–500)
POTASSIUM SERPL-SCNC: 4.3 MMOL/L (ref 3.5–5.2)
PROT SERPL-MCNC: 7.1 G/DL (ref 6–8.5)
RBC # BLD AUTO: 4.54 10*6/MM3 (ref 4.6–6)
SODIUM SERPL-SCNC: 142 MMOL/L (ref 136–145)
WBC # BLD AUTO: 4.67 10*3/MM3 (ref 4.5–10.7)

## 2019-01-30 ENCOUNTER — DOCUMENTATION (OUTPATIENT)
Dept: PHYSICAL THERAPY | Facility: CLINIC | Age: 64
End: 2019-01-30

## 2019-03-13 ENCOUNTER — OFFICE VISIT (OUTPATIENT)
Dept: FAMILY MEDICINE CLINIC | Facility: CLINIC | Age: 64
End: 2019-03-13

## 2019-03-13 VITALS
SYSTOLIC BLOOD PRESSURE: 120 MMHG | HEIGHT: 70 IN | WEIGHT: 232 LBS | HEART RATE: 67 BPM | DIASTOLIC BLOOD PRESSURE: 82 MMHG | BODY MASS INDEX: 33.21 KG/M2 | OXYGEN SATURATION: 98 %

## 2019-03-13 DIAGNOSIS — Z99.89 OBSTRUCTIVE SLEEP APNEA ON CPAP: ICD-10-CM

## 2019-03-13 DIAGNOSIS — H93.13 TINNITUS OF BOTH EARS: Primary | ICD-10-CM

## 2019-03-13 DIAGNOSIS — G47.33 OBSTRUCTIVE SLEEP APNEA ON CPAP: ICD-10-CM

## 2019-03-13 PROCEDURE — 99214 OFFICE O/P EST MOD 30 MIN: CPT | Performed by: NURSE PRACTITIONER

## 2019-03-13 RX ORDER — FLUTICASONE PROPIONATE 50 MCG
2 SPRAY, SUSPENSION (ML) NASAL DAILY
Qty: 1 BOTTLE | Refills: 5 | Status: SHIPPED | OUTPATIENT
Start: 2019-03-13 | End: 2020-07-14

## 2019-03-13 NOTE — PROGRESS NOTES
Subjective   Hiram Echevarria Jr. is a 63 y.o. male.     Needs new PCP.  Needs referral to new sleep specialist  History obstructive sleep apnea uses CPAP     Ringing in ears lilia  No pulsation  No hearing loss no unilateral ringing  No history of deafness or hearing loss several weeks low level    No new medications  Does not take PPIs chronic no ototoxic meds           The following portions of the patient's history were reviewed and updated as appropriate: allergies, current medications, past family history, past medical history, past social history, past surgical history and problem list.    Review of Systems   HENT: Positive for tinnitus. Negative for ear pain.    All other systems reviewed and are negative.      Objective   Physical Exam   Constitutional: He is oriented to person, place, and time. He appears well-developed and well-nourished. No distress.   HENT:   Head: Normocephalic and atraumatic.   Nose: Nose normal.   Mouth/Throat: Oropharynx is clear and moist.   Turbinates congestedTMs are dull and retracted   Eyes: Conjunctivae are normal. Pupils are equal, round, and reactive to light. No scleral icterus.   Neck: Neck supple. No JVD present. No thyromegaly present.   Cardiovascular: Normal rate, regular rhythm and normal heart sounds. Exam reveals no gallop and no friction rub.   No murmur heard.  Pulmonary/Chest: Effort normal and breath sounds normal. No respiratory distress. He has no wheezes. He has no rales.   Abdominal: Soft. Bowel sounds are normal. He exhibits no distension and no mass. There is no tenderness. There is no guarding. No hernia.   Musculoskeletal: He exhibits no edema or tenderness.   Lymphadenopathy:     He has no cervical adenopathy.   Neurological: He is alert and oriented to person, place, and time. He has normal reflexes.   Skin: Skin is warm and dry. No rash noted. He is not diaphoretic. No erythema.   Psychiatric: He has a normal mood and affect. His behavior is normal.  Judgment and thought content normal.   Vitals reviewed.        Assessment/Plan   Hiram was seen today for dr. simta carrera.    Diagnoses and all orders for this visit:    Obstructive sleep apnea on CPAP  -     Ambulatory Referral to Sleep Medicine    Tinnitus of both ears    Other orders  -     fluticasone (FLONASE) 50 MCG/ACT nasal spray; 2 sprays into the nostril(s) as directed by provider Daily.                  Flonase  Treat eustachian tube dysfunction  If ringing continues not improved in 3-6 weeks see ENT to evaluate hearing    Routine follow-up    Avoid chronically loud noises use hearing protection to prevent worsening

## 2019-03-26 ENCOUNTER — APPOINTMENT (OUTPATIENT)
Dept: SLEEP MEDICINE | Facility: HOSPITAL | Age: 64
End: 2019-03-26

## 2019-11-19 ENCOUNTER — OFFICE VISIT (OUTPATIENT)
Dept: FAMILY MEDICINE CLINIC | Facility: CLINIC | Age: 64
End: 2019-11-19

## 2019-11-19 VITALS
WEIGHT: 233 LBS | SYSTOLIC BLOOD PRESSURE: 120 MMHG | OXYGEN SATURATION: 97 % | HEART RATE: 63 BPM | HEIGHT: 70 IN | BODY MASS INDEX: 33.36 KG/M2 | TEMPERATURE: 98 F | DIASTOLIC BLOOD PRESSURE: 84 MMHG

## 2019-11-19 DIAGNOSIS — Z86.010 HISTORY OF COLON POLYPS: ICD-10-CM

## 2019-11-19 DIAGNOSIS — I87.2 VENOUS INSUFFICIENCY: ICD-10-CM

## 2019-11-19 DIAGNOSIS — K21.9 CHRONIC GERD: ICD-10-CM

## 2019-11-19 DIAGNOSIS — Z12.5 PROSTATE CANCER SCREENING: ICD-10-CM

## 2019-11-19 DIAGNOSIS — Z12.11 COLON CANCER SCREENING: ICD-10-CM

## 2019-11-19 DIAGNOSIS — Z00.00 HEALTH MAINTENANCE EXAMINATION: Primary | ICD-10-CM

## 2019-11-19 DIAGNOSIS — Z23 NEED FOR IMMUNIZATION AGAINST INFLUENZA: ICD-10-CM

## 2019-11-19 DIAGNOSIS — R31.21 ASYMPTOMATIC MICROSCOPIC HEMATURIA: ICD-10-CM

## 2019-11-19 PROBLEM — G47.33 OSA (OBSTRUCTIVE SLEEP APNEA): Status: ACTIVE | Noted: 2019-11-19

## 2019-11-19 LAB
DEVELOPER EXPIRATION DATE: NORMAL
DEVELOPER LOT NUMBER: NORMAL
EXPIRATION DATE: NORMAL
FECAL OCCULT BLOOD SCREEN, POC: NEGATIVE
Lab: NORMAL
NEGATIVE CONTROL: NEGATIVE
POSITIVE CONTROL: POSITIVE

## 2019-11-19 PROCEDURE — 90674 CCIIV4 VAC NO PRSV 0.5 ML IM: CPT | Performed by: NURSE PRACTITIONER

## 2019-11-19 PROCEDURE — 90471 IMMUNIZATION ADMIN: CPT | Performed by: NURSE PRACTITIONER

## 2019-11-19 PROCEDURE — 82270 OCCULT BLOOD FECES: CPT | Performed by: NURSE PRACTITIONER

## 2019-11-19 PROCEDURE — 99396 PREV VISIT EST AGE 40-64: CPT | Performed by: NURSE PRACTITIONER

## 2019-11-19 NOTE — PATIENT INSTRUCTIONS
Recommend 2000 khadar a day mostly vegetables chicken fish  Greatly decrease breads and pasta 64 ounces of water daily    Recommend vascular screenings every to 3 years    Continue back exercises  If more frequent pain this week start Aleve 2 tablets twice daily x1 to 2 weeks but he should return to office if his pain is not resolved in a couple weeks certainly any increasing pain fever chills weakness emergency room      Recommend  Compression knee-high socks  20 to 30 mmHg or in the morning off in the evening  For comfort and to decrease risk of varicose veins    Consider  mojo knee-high 20-30  [email or web address] around $23 XL

## 2019-11-19 NOTE — PROGRESS NOTES
"Subjective   Hiram Echevarria Jr. is a 63 y.o. male.     Pleasant gentleman here today for complete physical exam he is doing well    He has no hypertension no diabetes previous  glucose 103 was not actually a fasting   So he has no issues with hyperglycemia  Colonoscopy up-to-date 2018 with some polyps repeat 3 years  PSA normal 2018      Patient is a little bit of back pain right low back radiates around to his right lateral side he is had no fever no chills no weakness no severe pain started about a week ago  Better when he sits up out of the chair  No hematuria no urinary complaints no weakness no pain presently  EKG normal 2018 July               /84   Pulse 63   Temp 98 °F (36.7 °C) (Oral)   Ht 177.8 cm (70\")   Wt 106 kg (233 lb)   SpO2 97%   BMI 33.43 kg/m²       The following portions of the patient's history were reviewed and updated as appropriate: allergies, current medications, past family history, past medical history, past social history, past surgical history and problem list.    Review of Systems   Constitutional: Negative for fatigue and fever.   HENT: Negative.  Negative for trouble swallowing.    Eyes: Negative.    Respiratory: Negative.  Negative for cough and shortness of breath.    Cardiovascular: Negative for chest pain, palpitations and leg swelling.   Gastrointestinal: Negative.  Negative for abdominal pain.   Genitourinary: Negative.    Musculoskeletal: Negative.    Skin: Negative.    Neurological: Negative.  Negative for dizziness and confusion.   Psychiatric/Behavioral: Negative.        Objective   Physical Exam   Constitutional: He is oriented to person, place, and time. He appears well-developed and well-nourished. No distress.   HENT:   Head: Normocephalic and atraumatic.   Nose: Nose normal.   Mouth/Throat: Oropharynx is clear and moist.   Eyes: Conjunctivae are normal. Pupils are equal, round, and reactive to light.   Neck: Neck supple. No JVD present.   Cardiovascular: " Normal rate, regular rhythm and normal heart sounds.   No murmur heard.  Pulmonary/Chest: Effort normal and breath sounds normal. No respiratory distress. He has no wheezes.   Abdominal: Soft. Bowel sounds are normal. He exhibits no distension and no mass. There is no tenderness. There is no guarding. No hernia.   Musculoskeletal: He exhibits no edema or tenderness.   Lymphadenopathy:     He has no cervical adenopathy.   Neurological: He is alert and oriented to person, place, and time.   Skin: Skin is warm and dry. He is not diaphoretic.   Psychiatric: He has a normal mood and affect. His behavior is normal. Judgment and thought content normal.   Vitals reviewed.        Assessment/Plan   Hiram was seen today for annual exam.    Diagnoses and all orders for this visit:    Health maintenance examination  -     CBC & Differential  -     Comprehensive Metabolic Panel  -     Lipid Panel With LDL / HDL Ratio  -     Urinalysis With Microscopic If Indicated (No Culture) - Urine, Clean Catch  -     TSH Rfx On Abnormal To Free T4  -     PSA Screen  -     POC Occult Blood Stool    Asymptomatic microscopic hematuria  -     CBC & Differential  -     Comprehensive Metabolic Panel  -     Lipid Panel With LDL / HDL Ratio  -     Urinalysis With Microscopic If Indicated (No Culture) - Urine, Clean Catch  -     TSH Rfx On Abnormal To Free T4  -     PSA Screen  -     POC Occult Blood Stool    History of colon polyps  -     CBC & Differential  -     Comprehensive Metabolic Panel  -     Lipid Panel With LDL / HDL Ratio  -     Urinalysis With Microscopic If Indicated (No Culture) - Urine, Clean Catch  -     TSH Rfx On Abnormal To Free T4  -     PSA Screen  -     POC Occult Blood Stool    Chronic GERD  -     CBC & Differential  -     Comprehensive Metabolic Panel  -     Lipid Panel With LDL / HDL Ratio  -     Urinalysis With Microscopic If Indicated (No Culture) - Urine, Clean Catch  -     TSH Rfx On Abnormal To Free T4  -     PSA  Screen  -     POC Occult Blood Stool    Venous insufficiency  Comments:  Very mild early  Orders:  -     CBC & Differential  -     Comprehensive Metabolic Panel  -     Lipid Panel With LDL / HDL Ratio  -     Urinalysis With Microscopic If Indicated (No Culture) - Urine, Clean Catch  -     TSH Rfx On Abnormal To Free T4  -     PSA Screen  -     POC Occult Blood Stool    Prostate cancer screening  -     CBC & Differential  -     Comprehensive Metabolic Panel  -     Lipid Panel With LDL / HDL Ratio  -     Urinalysis With Microscopic If Indicated (No Culture) - Urine, Clean Catch  -     TSH Rfx On Abnormal To Free T4  -     PSA Screen  -     POC Occult Blood Stool    Colon cancer screening  -     CBC & Differential  -     Comprehensive Metabolic Panel  -     Lipid Panel With LDL / HDL Ratio  -     Urinalysis With Microscopic If Indicated (No Culture) - Urine, Clean Catch  -     TSH Rfx On Abnormal To Free T4  -     PSA Screen  -     POC Occult Blood Stool    Need for immunization against influenza  -     Flucelvax Quad=>4Years (2216-1897)    Other orders  -     Microscopic Examination -      Complete physical exam continue healthy lifestyle regular exercise lots of vegetables chicken fish 64 ounces water daily  Screenings labs as above    Risk-benefit PPI lowest effective dose        Patient Instructions   Recommend 2000 khadar a day mostly vegetables chicken fish  Greatly decrease breads and pasta 64 ounces of water daily

## 2019-11-20 LAB
ALBUMIN SERPL-MCNC: 4.9 G/DL (ref 3.5–5.2)
ALBUMIN/GLOB SERPL: 2 G/DL
ALP SERPL-CCNC: 93 U/L (ref 39–117)
ALT SERPL-CCNC: 36 U/L (ref 1–41)
APPEARANCE UR: CLEAR
AST SERPL-CCNC: 30 U/L (ref 1–40)
BACTERIA #/AREA URNS HPF: NORMAL /HPF
BASOPHILS # BLD AUTO: 0.03 10*3/MM3 (ref 0–0.2)
BASOPHILS NFR BLD AUTO: 0.8 % (ref 0–1.5)
BILIRUB SERPL-MCNC: 1.9 MG/DL (ref 0.2–1.2)
BILIRUB UR QL STRIP: NEGATIVE
BUN SERPL-MCNC: 18 MG/DL (ref 8–23)
BUN/CREAT SERPL: 17.1 (ref 7–25)
CALCIUM SERPL-MCNC: 9.6 MG/DL (ref 8.6–10.5)
CASTS URNS MICRO: NORMAL
CHLORIDE SERPL-SCNC: 101 MMOL/L (ref 98–107)
CHOLEST SERPL-MCNC: 188 MG/DL (ref 0–200)
CO2 SERPL-SCNC: 27.2 MMOL/L (ref 22–29)
COLOR UR: YELLOW
CREAT SERPL-MCNC: 1.05 MG/DL (ref 0.76–1.27)
EOSINOPHIL # BLD AUTO: 0.07 10*3/MM3 (ref 0–0.4)
EOSINOPHIL NFR BLD AUTO: 1.9 % (ref 0.3–6.2)
EPI CELLS #/AREA URNS HPF: NORMAL /HPF
ERYTHROCYTE [DISTWIDTH] IN BLOOD BY AUTOMATED COUNT: 12.5 % (ref 12.3–15.4)
GLOBULIN SER CALC-MCNC: 2.5 GM/DL
GLUCOSE SERPL-MCNC: 101 MG/DL (ref 65–99)
GLUCOSE UR QL: NEGATIVE
HCT VFR BLD AUTO: 43 % (ref 37.5–51)
HDLC SERPL-MCNC: 48 MG/DL (ref 40–60)
HGB BLD-MCNC: 15.4 G/DL (ref 13–17.7)
HGB UR QL STRIP: ABNORMAL
IMM GRANULOCYTES # BLD AUTO: 0.01 10*3/MM3 (ref 0–0.05)
IMM GRANULOCYTES NFR BLD AUTO: 0.3 % (ref 0–0.5)
KETONES UR QL STRIP: NEGATIVE
LDLC SERPL CALC-MCNC: 123 MG/DL (ref 0–100)
LDLC/HDLC SERPL: 2.57 {RATIO}
LEUKOCYTE ESTERASE UR QL STRIP: NEGATIVE
LYMPHOCYTES # BLD AUTO: 0.85 10*3/MM3 (ref 0.7–3.1)
LYMPHOCYTES NFR BLD AUTO: 23.4 % (ref 19.6–45.3)
MCH RBC QN AUTO: 34 PG (ref 26.6–33)
MCHC RBC AUTO-ENTMCNC: 35.8 G/DL (ref 31.5–35.7)
MCV RBC AUTO: 94.9 FL (ref 79–97)
MONOCYTES # BLD AUTO: 0.37 10*3/MM3 (ref 0.1–0.9)
MONOCYTES NFR BLD AUTO: 10.2 % (ref 5–12)
NEUTROPHILS # BLD AUTO: 2.31 10*3/MM3 (ref 1.7–7)
NEUTROPHILS NFR BLD AUTO: 63.4 % (ref 42.7–76)
NITRITE UR QL STRIP: NEGATIVE
NRBC BLD AUTO-RTO: 0 /100 WBC (ref 0–0.2)
PH UR STRIP: 7 [PH] (ref 5–8)
PLATELET # BLD AUTO: 231 10*3/MM3 (ref 140–450)
POTASSIUM SERPL-SCNC: 4.8 MMOL/L (ref 3.5–5.2)
PROT SERPL-MCNC: 7.4 G/DL (ref 6–8.5)
PROT UR QL STRIP: NEGATIVE
PSA SERPL-MCNC: 0.43 NG/ML (ref 0–4)
RBC # BLD AUTO: 4.53 10*6/MM3 (ref 4.14–5.8)
RBC #/AREA URNS HPF: NORMAL /HPF
SODIUM SERPL-SCNC: 140 MMOL/L (ref 136–145)
SP GR UR: 1.01 (ref 1–1.03)
TRIGL SERPL-MCNC: 83 MG/DL (ref 0–150)
TSH SERPL DL<=0.005 MIU/L-ACNC: 2.24 UIU/ML (ref 0.27–4.2)
UROBILINOGEN UR STRIP-MCNC: ABNORMAL MG/DL
VLDLC SERPL CALC-MCNC: 16.6 MG/DL
WBC # BLD AUTO: 3.64 10*3/MM3 (ref 3.4–10.8)
WBC #/AREA URNS HPF: NORMAL /HPF

## 2020-05-05 ENCOUNTER — TELEMEDICINE (OUTPATIENT)
Dept: FAMILY MEDICINE CLINIC | Facility: CLINIC | Age: 65
End: 2020-05-05

## 2020-05-05 ENCOUNTER — TELEPHONE (OUTPATIENT)
Dept: FAMILY MEDICINE CLINIC | Facility: CLINIC | Age: 65
End: 2020-05-05

## 2020-05-05 DIAGNOSIS — M54.50 ACUTE LEFT-SIDED LOW BACK PAIN WITHOUT SCIATICA: Primary | ICD-10-CM

## 2020-05-05 PROCEDURE — 99214 OFFICE O/P EST MOD 30 MIN: CPT | Performed by: NURSE PRACTITIONER

## 2020-05-05 NOTE — TELEPHONE ENCOUNTER
LVM for patient to call the office to schedule a MyChart video visit    [facility]- if patient calls back please schedule them for a MyChart video visit. They have an active MyChart account. Also, please ask what you of phone they have and add it to appt notes.  Thank you.

## 2020-05-05 NOTE — PATIENT INSTRUCTIONS
Discharge instructions    Your back pain cyst most likely a pinched nerve  Which radiates to your groin  Versus a small kidney stone you have not passed, or have passed with residual pain?    Ibuprofen 800 mg  Take 3 times a day with food and push fluids to protect kidneys  Take this for 1 to 2 weeks for inflammation and pain  Left sitting more standing or lying the next couple weeks  In a week or 2 slowly start back exercises but work around the pain  Simply [employer] back exercises    [employer] very informative website with lots of informative videos    If fever severe pain abdominal pain  Weakness increased nausea vomiting malaise  Groin paresthesias then emergency room    Otherwise I will follow you with this pain until it resolves  If not improving soon you may need further radiological studies and labs etc.  As discussed    Thank you!!

## 2020-05-05 NOTE — PROGRESS NOTES
Subjective   Hiram Echevarria Jr. is a 64 y.o. male.     Patient complains left low back pain gradual onset which radiates to his left shaft of his penis.  Symptoms started  For 5 days ago left side of his penis and then 2 days later left low back.  Pain has ranged from moderate to severe.  Presently around 5 out of 10 achy sharp pain  Generally not related to urination although a couple times possibly  No hematuria no dysuria no frequency urgency he has no testicular pain or swelling he has no swelling on the tip of his penis  No flank pain or under his ribs his pain originates low around his belt mid and left lower lumbar region approximate L5-S1  And radiates laterally around lateral hip to his penis and spares abdomen and testies    And back surgery disc herniation early 2000 and lower lumbar  Kidney stone several years ago does not recall the side but he has no recurrent kidney stones does not feel like a kidney stone previously that felt different  No night sweats no unexplained weight loss no fevers chills or other complaints  Position does aggravate pain worse with setting    He is a  sits quite a bit normally    He is taken some ibuprofen up to 600 mg a day helps  The last couple days he has no history of kidney failure  No history of ulcerations or contraindications to NSAID           There were no vitals taken for this visit.      The following portions of the patient's history were reviewed and updated as appropriate: allergies, current medications, past family history, past medical history, past social history, past surgical history and problem list.    Review of Systems   Constitutional: Negative for fatigue and fever.   HENT: Negative.  Negative for trouble swallowing.    Eyes: Negative.    Respiratory: Negative.  Negative for cough and shortness of breath.    Cardiovascular: Negative for chest pain, palpitations and leg swelling.   Gastrointestinal: Negative.  Negative for abdominal pain.  "  Genitourinary: Negative.    Musculoskeletal: Positive for back pain.   Skin: Negative.    Neurological: Negative.  Negative for dizziness and confusion.   Psychiatric/Behavioral: Negative.    All other systems reviewed and are negative.      Objective   Physical Exam   Constitutional: He appears well-developed and well-nourished.   Pleasant appears well   HENT:   Head: Normocephalic and atraumatic.   Eyes: Pupils are equal, round, and reactive to light. Conjunctivae are normal.   Neck: Neck supple.   Pulmonary/Chest: Effort normal.   Musculoskeletal:   Some mild tenderness left lower lumbar sacrum left mid  Otherwise able to stand without difficulty  No distress noted   Skin: Skin is warm and dry. No rash noted. No erythema.   Psychiatric: He has a normal mood and affect. His behavior is normal. Judgment and thought content normal.   Vitals reviewed.        Assessment/Plan   Diagnoses and all orders for this visit:    Acute left-sided low back pain without sciatica  Comments:  Pinched nerve versus kidney stone      Left low back pain likely pinched nerve less likely but cannot rule out possibility of a kidney stone  We will follow \"and work-up accordingly  He is without severe complaints without fever presently              There are no Patient Instructions on file for this visit.           "

## 2020-07-14 ENCOUNTER — TELEMEDICINE (OUTPATIENT)
Dept: FAMILY MEDICINE CLINIC | Facility: TELEHEALTH | Age: 65
End: 2020-07-14

## 2020-07-14 ENCOUNTER — APPOINTMENT (OUTPATIENT)
Dept: CT IMAGING | Facility: HOSPITAL | Age: 65
End: 2020-07-14

## 2020-07-14 ENCOUNTER — HOSPITAL ENCOUNTER (OUTPATIENT)
Facility: HOSPITAL | Age: 65
Setting detail: OBSERVATION
Discharge: HOME | End: 2020-07-15
Attending: INTERNAL MEDICINE | Admitting: INTERNAL MEDICINE

## 2020-07-14 ENCOUNTER — APPOINTMENT (OUTPATIENT)
Dept: GENERAL RADIOLOGY | Facility: HOSPITAL | Age: 65
End: 2020-07-14

## 2020-07-14 DIAGNOSIS — R06.00 DYSPNEA, UNSPECIFIED TYPE: ICD-10-CM

## 2020-07-14 DIAGNOSIS — R07.9 CHEST PAIN, UNSPECIFIED TYPE: Primary | ICD-10-CM

## 2020-07-14 DIAGNOSIS — D70.9 NEUTROPENIA, UNSPECIFIED TYPE (HCC): ICD-10-CM

## 2020-07-14 LAB
ALBUMIN SERPL-MCNC: 4.3 G/DL (ref 3.5–5.2)
ALBUMIN/GLOB SERPL: 1.9 G/DL
ALP SERPL-CCNC: 82 U/L (ref 39–117)
ALT SERPL W P-5'-P-CCNC: 42 U/L (ref 1–41)
ANION GAP SERPL CALCULATED.3IONS-SCNC: 13 MMOL/L (ref 5–15)
AST SERPL-CCNC: 35 U/L (ref 1–40)
BASOPHILS # BLD AUTO: 0 10*3/MM3 (ref 0–0.2)
BASOPHILS NFR BLD AUTO: 0.5 % (ref 0–1.5)
BILIRUB SERPL-MCNC: 1.5 MG/DL (ref 0–1.2)
BUN SERPL-MCNC: 11 MG/DL (ref 8–23)
BUN SERPL-MCNC: ABNORMAL MG/DL
BUN/CREAT SERPL: ABNORMAL
CALCIUM SPEC-SCNC: 9.4 MG/DL (ref 8.6–10.5)
CHLORIDE SERPL-SCNC: 95 MMOL/L (ref 98–107)
CO2 SERPL-SCNC: 25 MMOL/L (ref 22–29)
CREAT SERPL-MCNC: 1.23 MG/DL (ref 0.76–1.27)
D DIMER PPP FEU-MCNC: 1.29 MCGFEU/ML (ref 0.17–0.59)
DEPRECATED RDW RBC AUTO: 42.9 FL (ref 37–54)
EOSINOPHIL # BLD AUTO: 0 10*3/MM3 (ref 0–0.4)
EOSINOPHIL NFR BLD AUTO: 0.1 % (ref 0.3–6.2)
ERYTHROCYTE [DISTWIDTH] IN BLOOD BY AUTOMATED COUNT: 13 % (ref 12.3–15.4)
GFR SERPL CREATININE-BSD FRML MDRD: 60 ML/MIN/1.73
GLOBULIN UR ELPH-MCNC: 2.3 GM/DL
GLUCOSE SERPL-MCNC: 130 MG/DL (ref 65–99)
HCT VFR BLD AUTO: 39.8 % (ref 37.5–51)
HGB BLD-MCNC: 13.7 G/DL (ref 13–17.7)
HOLD SPECIMEN: NORMAL
HOLD SPECIMEN: NORMAL
LYMPHOCYTES # BLD AUTO: 0.3 10*3/MM3 (ref 0.7–3.1)
LYMPHOCYTES NFR BLD AUTO: 12.4 % (ref 19.6–45.3)
MCH RBC QN AUTO: 32.9 PG (ref 26.6–33)
MCHC RBC AUTO-ENTMCNC: 34.6 G/DL (ref 31.5–35.7)
MCV RBC AUTO: 95.1 FL (ref 79–97)
MONOCYTES # BLD AUTO: 0.4 10*3/MM3 (ref 0.1–0.9)
MONOCYTES NFR BLD AUTO: 17.2 % (ref 5–12)
NEUTROPHILS NFR BLD AUTO: 1.8 10*3/MM3 (ref 1.7–7)
NEUTROPHILS NFR BLD AUTO: 69.8 % (ref 42.7–76)
NRBC BLD AUTO-RTO: 0.2 /100 WBC (ref 0–0.2)
NT-PROBNP SERPL-MCNC: 152.3 PG/ML (ref 0–900)
PLATELET # BLD AUTO: 185 10*3/MM3 (ref 140–450)
PMV BLD AUTO: 6.6 FL (ref 6–12)
POTASSIUM SERPL-SCNC: 4 MMOL/L (ref 3.5–5.2)
PROT SERPL-MCNC: 6.6 G/DL (ref 6–8.5)
RBC # BLD AUTO: 4.18 10*6/MM3 (ref 4.14–5.8)
SARS-COV-2 RNA PNL SPEC NAA+PROBE: NOT DETECTED
SODIUM SERPL-SCNC: 133 MMOL/L (ref 136–145)
TROPONIN T SERPL-MCNC: <0.01 NG/ML (ref 0–0.03)
WBC # BLD AUTO: 2.6 10*3/MM3 (ref 3.4–10.8)
WHOLE BLOOD HOLD SPECIMEN: NORMAL
WHOLE BLOOD HOLD SPECIMEN: NORMAL

## 2020-07-14 PROCEDURE — 71275 CT ANGIOGRAPHY CHEST: CPT

## 2020-07-14 PROCEDURE — 71045 X-RAY EXAM CHEST 1 VIEW: CPT

## 2020-07-14 PROCEDURE — G0378 HOSPITAL OBSERVATION PER HR: HCPCS

## 2020-07-14 PROCEDURE — 0 IOPAMIDOL PER 1 ML: Performed by: NURSE PRACTITIONER

## 2020-07-14 PROCEDURE — 25010000002 ONDANSETRON PER 1 MG: Performed by: NURSE PRACTITIONER

## 2020-07-14 PROCEDURE — 93005 ELECTROCARDIOGRAM TRACING: CPT | Performed by: INTERNAL MEDICINE

## 2020-07-14 PROCEDURE — 99219 PR INITIAL OBSERVATION CARE/DAY 50 MINUTES: CPT | Performed by: STUDENT IN AN ORGANIZED HEALTH CARE EDUCATION/TRAINING PROGRAM

## 2020-07-14 PROCEDURE — 83880 ASSAY OF NATRIURETIC PEPTIDE: CPT | Performed by: NURSE PRACTITIONER

## 2020-07-14 PROCEDURE — 96361 HYDRATE IV INFUSION ADD-ON: CPT

## 2020-07-14 PROCEDURE — 87635 SARS-COV-2 COVID-19 AMP PRB: CPT | Performed by: NURSE PRACTITIONER

## 2020-07-14 PROCEDURE — 99213 OFFICE O/P EST LOW 20 MIN: CPT | Performed by: NURSE PRACTITIONER

## 2020-07-14 PROCEDURE — 99284 EMERGENCY DEPT VISIT MOD MDM: CPT

## 2020-07-14 PROCEDURE — 96374 THER/PROPH/DIAG INJ IV PUSH: CPT

## 2020-07-14 PROCEDURE — 84484 ASSAY OF TROPONIN QUANT: CPT | Performed by: STUDENT IN AN ORGANIZED HEALTH CARE EDUCATION/TRAINING PROGRAM

## 2020-07-14 PROCEDURE — 85379 FIBRIN DEGRADATION QUANT: CPT | Performed by: NURSE PRACTITIONER

## 2020-07-14 PROCEDURE — 93005 ELECTROCARDIOGRAM TRACING: CPT

## 2020-07-14 PROCEDURE — 85025 COMPLETE CBC W/AUTO DIFF WBC: CPT | Performed by: NURSE PRACTITIONER

## 2020-07-14 PROCEDURE — 84484 ASSAY OF TROPONIN QUANT: CPT | Performed by: NURSE PRACTITIONER

## 2020-07-14 PROCEDURE — 80053 COMPREHEN METABOLIC PANEL: CPT | Performed by: NURSE PRACTITIONER

## 2020-07-14 RX ORDER — SODIUM CHLORIDE 0.9 % (FLUSH) 0.9 %
10 SYRINGE (ML) INJECTION EVERY 12 HOURS SCHEDULED
Status: DISCONTINUED | OUTPATIENT
Start: 2020-07-14 | End: 2020-07-15 | Stop reason: HOSPADM

## 2020-07-14 RX ORDER — SODIUM CHLORIDE 0.9 % (FLUSH) 0.9 %
10 SYRINGE (ML) INJECTION AS NEEDED
Status: DISCONTINUED | OUTPATIENT
Start: 2020-07-14 | End: 2020-07-15 | Stop reason: HOSPADM

## 2020-07-14 RX ORDER — ASPIRIN 81 MG/1
324 TABLET, CHEWABLE ORAL DAILY
Status: DISCONTINUED | OUTPATIENT
Start: 2020-07-15 | End: 2020-07-15 | Stop reason: HOSPADM

## 2020-07-14 RX ORDER — SODIUM CHLORIDE 9 MG/ML
100 INJECTION, SOLUTION INTRAVENOUS CONTINUOUS
Status: DISCONTINUED | OUTPATIENT
Start: 2020-07-14 | End: 2020-07-15 | Stop reason: HOSPADM

## 2020-07-14 RX ORDER — ASPIRIN 81 MG/1
324 TABLET, CHEWABLE ORAL ONCE
Status: COMPLETED | OUTPATIENT
Start: 2020-07-14 | End: 2020-07-14

## 2020-07-14 RX ORDER — DOCUSATE SODIUM 100 MG/1
100 CAPSULE, LIQUID FILLED ORAL 2 TIMES DAILY PRN
Status: DISCONTINUED | OUTPATIENT
Start: 2020-07-14 | End: 2020-07-15 | Stop reason: HOSPADM

## 2020-07-14 RX ORDER — ACETAMINOPHEN 160 MG/5ML
650 SOLUTION ORAL EVERY 4 HOURS PRN
Status: DISCONTINUED | OUTPATIENT
Start: 2020-07-14 | End: 2020-07-15 | Stop reason: HOSPADM

## 2020-07-14 RX ORDER — ONDANSETRON 2 MG/ML
4 INJECTION INTRAMUSCULAR; INTRAVENOUS EVERY 6 HOURS PRN
Status: DISCONTINUED | OUTPATIENT
Start: 2020-07-14 | End: 2020-07-15 | Stop reason: HOSPADM

## 2020-07-14 RX ORDER — ACETAMINOPHEN 325 MG/1
650 TABLET ORAL EVERY 4 HOURS PRN
Status: DISCONTINUED | OUTPATIENT
Start: 2020-07-14 | End: 2020-07-15 | Stop reason: HOSPADM

## 2020-07-14 RX ORDER — ONDANSETRON 2 MG/ML
4 INJECTION INTRAMUSCULAR; INTRAVENOUS ONCE
Status: COMPLETED | OUTPATIENT
Start: 2020-07-14 | End: 2020-07-14

## 2020-07-14 RX ORDER — ACETAMINOPHEN 650 MG/1
650 SUPPOSITORY RECTAL EVERY 4 HOURS PRN
Status: DISCONTINUED | OUTPATIENT
Start: 2020-07-14 | End: 2020-07-15 | Stop reason: HOSPADM

## 2020-07-14 RX ORDER — NITROGLYCERIN 0.4 MG/1
0.4 TABLET SUBLINGUAL
Status: DISCONTINUED | OUTPATIENT
Start: 2020-07-14 | End: 2020-07-15 | Stop reason: HOSPADM

## 2020-07-14 RX ORDER — BISACODYL 10 MG
10 SUPPOSITORY, RECTAL RECTAL DAILY PRN
Status: DISCONTINUED | OUTPATIENT
Start: 2020-07-14 | End: 2020-07-15 | Stop reason: HOSPADM

## 2020-07-14 RX ORDER — ONDANSETRON 4 MG/1
4 TABLET, FILM COATED ORAL EVERY 6 HOURS PRN
Status: DISCONTINUED | OUTPATIENT
Start: 2020-07-14 | End: 2020-07-15 | Stop reason: HOSPADM

## 2020-07-14 RX ORDER — CHOLECALCIFEROL (VITAMIN D3) 125 MCG
5 CAPSULE ORAL NIGHTLY PRN
Status: DISCONTINUED | OUTPATIENT
Start: 2020-07-14 | End: 2020-07-15 | Stop reason: HOSPADM

## 2020-07-14 RX ADMIN — SODIUM CHLORIDE 100 ML/HR: 900 INJECTION, SOLUTION INTRAVENOUS at 22:56

## 2020-07-14 RX ADMIN — Medication 10 ML: at 23:05

## 2020-07-14 RX ADMIN — ASPIRIN 81 MG 324 MG: 81 TABLET ORAL at 18:42

## 2020-07-14 RX ADMIN — ACETAMINOPHEN 650 MG: 325 TABLET, FILM COATED ORAL at 22:56

## 2020-07-14 RX ADMIN — IOPAMIDOL 100 ML: 755 INJECTION, SOLUTION INTRAVENOUS at 20:12

## 2020-07-14 RX ADMIN — ONDANSETRON 4 MG: 2 INJECTION, SOLUTION INTRAMUSCULAR; INTRAVENOUS at 18:40

## 2020-07-14 RX ADMIN — NITROGLYCERIN 0.4 MG: 0.4 TABLET SUBLINGUAL at 19:39

## 2020-07-14 NOTE — PROGRESS NOTES
Subjective   Hiram Echevarria Jr. is a 64 y.o. male seen by virtual visit for chest pain.     Felt bad yesterday, but today feels worse.  Aches, bad headache, chest pressure going thru to back.  Cold.  Nausea.    Chest Pain    This is a new problem. The current episode started today. The onset quality is gradual. The problem occurs constantly. The problem has been unchanged. The pain is present in the substernal region. The pain is moderate. The quality of the pain is described as pressure. The pain radiates to the upper back. Associated symptoms include back pain, headaches and nausea. Pertinent negatives include no abdominal pain, cough, fever, shortness of breath or vomiting. The pain is aggravated by nothing. He has tried nothing for the symptoms. The treatment provided no relief. Risk factors: FMH CAD.   His family medical history is significant for CAD.        The following portions of the patient's history were reviewed and updated as appropriate: allergies, current medications, past family history, past medical history, past social history, past surgical history and problem list.    Review of Systems   Constitutional: Positive for activity change and chills. Negative for fever.   Respiratory: Positive for chest tightness. Negative for cough and shortness of breath.    Cardiovascular: Positive for chest pain.   Gastrointestinal: Positive for nausea. Negative for abdominal pain and vomiting.   Musculoskeletal: Positive for back pain and myalgias.   Allergic/Immunologic: Negative for food allergies and immunocompromised state.       Objective   Physical Exam   Constitutional: He is oriented to person, place, and time. He appears well-developed and well-nourished. No distress.   Conversational, covered up with blanket, wearing mask, reclined in chair.  Wife with him.   HENT:   Head: Normocephalic and atraumatic.   Pulmonary/Chest: Effort normal. No respiratory distress.   Neurological: He is alert and oriented to  person, place, and time.   Skin: He is not diaphoretic.   Psychiatric: He has a normal mood and affect. His behavior is normal. Judgment and thought content normal.         Assessment/Plan   Hiram was seen today for chest pain.    Diagnoses and all orders for this visit:    Chest pain, unspecified type  -     QUESTIONNAIRE SERIES      I recommended that they go to the ED now for further evaluation of chest pain.  They agree to this.  Wife says she can get him there and feels he is stable to transport.

## 2020-07-14 NOTE — ED PROVIDER NOTES
"Subjective   64-year-old male presents with complaint of midsternal chest pain described as constant radiates through to his back described as \"sore\" he reports this occurred while \"cutting 15 to 20 acres a hay and bouncing on the tractor\" he also reports subjective fever with nausea and a frontal headache that radiates around to the crown.  He reports he does have a history of sinusitis but it is been over a year since his last episode.  Pain is reproducible with palpation.  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  He does report mild dyspnea.  Reports that he drank lots of water, Powerade, and a Diet Coke to \"Try to ease my stomach\" he denies ill contact reports that he was negative for COVID in May.  He does report a first-degree familial history of CAD, his father who is  from [place].  He denies any medical history nor does he take daily medications.  He is never been followed by cardiology.    1. Location: midsternal chest  2. Quality: sore  3. Severity: moderate  4. Worsening factors: palpation  5. Alleviating factors: denies  6. Onset: this afternoon while working  7. Radiation: through to back  8. Frequency: constant with periods of intensity  9. Co-morbidities: Past Medical History:  2018: Asymptomatic microscopic hematuria      Comment:  negative  workkup  No date: Bruit      Comment:  LEFT CAROTID ARTERY  No date: Colon polyps      Comment:  FOLLOWED BY DR. SCARLET FERREIRA  No date: GERD (gastroesophageal reflux disease)  2016: Laceration of finger      Comment:  2ND FINGER, RIGHT HAND  2012: Laceration of finger      Comment:  LEFT THUMB, SEEN AT [facility]  No date: Neuropathy  No date: Neutropenia (CMS/HCC)  No date: OA (osteoarthritis)  No date: DOREEN (obstructive sleep apnea)      Comment:  USES CPAP  2016: Paronychia of finger, right      Comment:  2ND " FINGER  No date: PONV (postoperative nausea and vomiting)  No date: Right cataract  No date: Snoring  No date: Wears glasses  10. Source: patient            Review of Systems   Constitutional: Positive for chills and diaphoresis. Negative for activity change, fatigue and fever.   Respiratory: Positive for cough and shortness of breath. Negative for chest tightness and wheezing.    Cardiovascular: Positive for chest pain. Negative for palpitations and leg swelling.   Gastrointestinal: Positive for nausea. Negative for abdominal pain and vomiting.   Skin: Negative for color change, pallor and rash.   Neurological: Positive for headaches. Negative for dizziness, syncope, weakness and numbness.   Hematological: Does not bruise/bleed easily.   All other systems reviewed and are negative.      Past Medical History:   Diagnosis Date   • Asymptomatic microscopic hematuria 03/2018    negative  workkup   • Bruit     LEFT CAROTID ARTERY   • Colon polyps     FOLLOWED BY DR. SCARLET FERREIRA   • GERD (gastroesophageal reflux disease)    • Laceration of finger 05/16/2016    2ND FINGER, RIGHT HAND   • Laceration of finger 09/03/2012    LEFT THUMB, SEEN AT [facility]   • Neuropathy    • Neutropenia (CMS/HCC)    • OA (osteoarthritis)    • DOREEN (obstructive sleep apnea)     USES CPAP   • Paronychia of finger, right 05/2016    2ND FINGER   • PONV (postoperative nausea and vomiting)    • Right cataract    • Snoring    • Wears glasses        Allergies   Allergen Reactions   • Morphine Nausea Only       Past Surgical History:   Procedure Laterality Date   • CARPAL TUNNEL RELEASE Left 1998   • CARPAL TUNNEL RELEASE Right 08/27/1999    DR. EDVIN ANDRADE AT [place]   • COLONOSCOPY N/A 12/6/2018    (3) 5 MM SESSILE TUBULAR ADENOMA POLYPS, DIVERTICULOSIS, RESCOPE IN 3 YRS, DR. SCARLET FERREIRA AT [facility]   • COLONOSCOPY W/ POLYPECTOMY N/A 12/18/2013    5 MM ADENOMATOUS POLYP IN TRANSVERSE, DIVERTICULOSIS IN SIGMOID, RESCOPE IN 5 YRS, DR. SCARLET FERREIRA AT [facility]    • KNEE ARTHROSCOPY Right 06/21/2005    DR. TARUN JURADO AT [place]   • KNEE MENISCAL REPAIR Left 08/13/2018    DR. SERGIO ANDREW   • LUMBAR DISC SURGERY Left 2002    L5-S1    • TONSILLECTOMY Bilateral    • TOTAL SHOULDER ARTHROPLASTY Right 09/30/2014    DR. SERGIO ANDREW AT [place]       Family History   Problem Relation Age of Onset   • Hypertension Mother    • Diabetes Mother    • Hypertension Father    • Diabetes Father    • Stroke Father    • Heart disease Father    • Colon cancer Neg Hx    • Prostate cancer Neg Hx        Social History     Socioeconomic History   • Marital status:      Spouse name: Not on file   • Number of children: Not on file   • Years of education: Not on file   • Highest education level: Not on file   Tobacco Use   • Smoking status: Never Smoker   • Smokeless tobacco: Former User     Types: Chew   Substance and Sexual Activity   • Alcohol use: Yes     Alcohol/week: 4.0 standard drinks     Types: 4 Shots of liquor per week   • Drug use: No   • Sexual activity: Defer           Objective   Physical Exam   Constitutional: He is oriented to person, place, and time. Vital signs are normal. He appears well-developed and well-nourished. He is active and cooperative.  Non-toxic appearance. No distress.   HENT:   Head: Normocephalic and atraumatic.   Eyes: Pupils are equal, round, and reactive to light. Conjunctivae and EOM are normal.   Neck: Normal range of motion. Neck supple. No JVD present. No tracheal deviation present. No thyromegaly present.   Cardiovascular: Normal rate, regular rhythm, S1 normal, S2 normal, normal heart sounds, intact distal pulses and normal pulses. Exam reveals no gallop and no friction rub.   No murmur heard.  Pulses:       Radial pulses are 2+ on the right side, and 2+ on the left side.        Dorsalis pedis pulses are 2+ on the right side, and 2+ on the left side.        Posterior tibial pulses are 2+ on the right side, and 2+ on the left side.    Pulmonary/Chest: Effort normal and breath sounds normal. No respiratory distress. He has no wheezes. He has no rales. He exhibits bony tenderness. He exhibits no tenderness.       Abdominal: Soft. Bowel sounds are normal. He exhibits no distension and no mass. There is no tenderness. There is no rebound and no guarding. No hernia.   Musculoskeletal: Normal range of motion.        Right lower leg: He exhibits edema. He exhibits no tenderness.        Left lower leg: He exhibits edema. He exhibits no tenderness.   Neurological: He is alert and oriented to person, place, and time.   Skin: Skin is warm and dry. Capillary refill takes less than 2 seconds.   Psychiatric: He has a normal mood and affect. His behavior is normal. Judgment and thought content normal. His mood appears not anxious.   Nursing note and vitals reviewed.      Procedures           ED Course  ED Course as of Jul 14 2052 Tue Jul 14, 2020   1825 Sinus rhythm rate of 82.  No previous available for comparison.  Interpreted Dr. Sue and reviewed by me.   ECG 12 Lead [AL]   1932 Informed patient of elevated d-dimer and need for CT chest. Also informed that he would most likely be admitted for cardiac work-up. Patient is agreeable to admission.     [AL]      ED Course User Index  [AL] Kimber Polanco, NP      Xr Chest 1 View    Result Date: 7/14/2020  No active cardiopulmonary disease.   Electronically Signed By-Elias Ferguson DO. On:7/14/2020 6:38 PM This report was finalized on [id number] by  Elias Ferguson DO..    Ct Chest Pulmonary Embolism    Result Date: 7/14/2020   1. No pulmonary embolus or acute intrathoracic abnormality. 2. Small sliding hiatal hernia.  Electronically Signed By-Elias Ferguson DO. On:7/14/2020 8:25 PM This report was finalized on [id number] by  Elias Ferguson DO..    Medications   sodium chloride 0.9 % flush 10 mL (has no administration in time range)   nitroglycerin (NITROSTAT) SL tablet 0.4 mg (0.4 mg Sublingual Given  7/14/20 1939)   aspirin chewable tablet 324 mg (324 mg Oral Given 7/14/20 1842)   ondansetron (ZOFRAN) injection 4 mg (4 mg Intravenous Given 7/14/20 1840)   iopamidol (ISOVUE-370) 76 % injection 100 mL (100 mL Intravenous Given 7/14/20 2012)     Labs Reviewed   COMPREHENSIVE METABOLIC PANEL - Abnormal; Notable for the following components:       Result Value    Glucose 130 (*)     Sodium 133 (*)     Chloride 95 (*)     ALT (SGPT) 42 (*)     Total Bilirubin 1.5 (*)     eGFR Non  Amer 60 (*)     All other components within normal limits    Narrative:     GFR Normal >60  Chronic Kidney Disease <60  Kidney Failure <15     CBC WITH AUTO DIFFERENTIAL - Abnormal; Notable for the following components:    WBC 2.60 (*)     Lymphocyte % 12.4 (*)     Monocyte % 17.2 (*)     Eosinophil % 0.1 (*)     Lymphocytes, Absolute 0.30 (*)     All other components within normal limits   D-DIMER, QUANTITATIVE - Abnormal; Notable for the following components:    D-Dimer, Quantitative 1.29 (*)     All other components within normal limits    Narrative:     Reference Range  --------------------------------------------------------------------     < 0.50   Negative Predictive Value  0.50-0.59   Indeterminate    >= 0.60   Probable VTE             A very low percentage of patients with DVT may yield D-Dimer results   below the cut-off of 0.50 MCGFEU/mL.  This is known to be more   prevalent in patients with distal DVT.             Results of this test should always be interpreted in conjunction with   the patient's medical history, clinical presentation and other   findings.  Clinical diagnosis should not be based on the result of   IN[place]OV[place] D-Dimer alone.   COVID-19 [facility] IN-HOUSE, NASAL SWAB NO TRANSPORT MEDIA - Normal    Narrative:     Fact sheet for providers: https://www.fda.gov/media/331392/download     Fact sheet for patients: https://www.fda.gov/media/110644/download   TROPONIN (IN-HOUSE) - Normal    Narrative:     Troponin T  Reference Range:  <= 0.03 ng/mL-   Negative for AMI  >0.03 ng/mL-     Abnormal for myocardial necrosis.  Clinicians would have to utilize clinical acumen, EKG, Troponin and serial changes to determine if it is an Acute Myocardial Infarction or myocardial injury due to an underlying chronic condition.       Results may be falsely decreased if patient taking Biotin.     BNP (IN-HOUSE) - Normal    Narrative:     Among patients with dyspnea, NT-proBNP is highly sensitive for the detection of acute congestive heart failure. In addition NT-proBNP of <300 pg/ml effectively rules out acute congestive heart failure with 99% negative predictive value.    Results may be falsely decreased if patient taking Biotin.     BUN - Normal   RAINBOW DRAW    Narrative:     The following orders were created for panel order [place] Draw.  Procedure                               Abnormality         Status                     ---------                               -----------         ------                     Light Blue Top[[id number]]                                   Final result               Green Top (Gel)[[id number]]                                  Final result               Lavender Top[[id number]]                                     Final result               Gold Top - SST[[id number]]                                   Final result                 Please view results for these tests on the individual orders.   TROPONIN (IN-HOUSE)   CBC AND DIFFERENTIAL    Narrative:     The following orders were created for panel order CBC & Differential.  Procedure                               Abnormality         Status                     ---------                               -----------         ------                     CBC Auto Differential[[id number]]        Abnormal            Final result                 Please view results for these tests on the individual orders.   LIGHT BLUE TOP   GREEN TOP   LAVENDER TOP   GOLD TOP - SST                   "                        MDM  Number of Diagnoses or Management Options  Chest pain, unspecified type:   Dyspnea, unspecified type:   Neutropenia, unspecified type (CMS/HCC):   Diagnosis management comments: Chart Review: Patient had telehealth visit today and was referred to the ER.  Comorbidity: Past Medical History:  03/2018: Asymptomatic microscopic hematuria      Comment:  negative  workkup  No date: Bruit      Comment:  LEFT CAROTID ARTERY  No date: Colon polyps      Comment:  FOLLOWED BY DR. SCARLET FERREIRA  No date: GERD (gastroesophageal reflux disease)  05/16/2016: Laceration of finger      Comment:  2ND FINGER, RIGHT HAND  09/03/2012: Laceration of finger      Comment:  LEFT THUMB, SEEN AT [facility]  No date: Neuropathy  No date: Neutropenia (CMS/HCC)  No date: OA (osteoarthritis)  No date: DOREEN (obstructive sleep apnea)      Comment:  USES CPAP  05/2016: Paronychia of finger, right      Comment:  2ND FINGER  No date: PONV (postoperative nausea and vomiting)  No date: Right cataract  No date: Snoring  No date: Wears glasses  Imaging: Was interpreted by physician and reviewed by myself: Xr Chest 1 View    Result Date: 7/14/2020  No active cardiopulmonary disease.   Electronically Signed By-Elias Ferguson DO. On:7/14/2020 6:38 PM This report was finalized on [id number] by  Elias Ferguson DO..    Ct Chest Pulmonary Embolism    Result Date: 7/14/2020   1. No pulmonary embolus or acute intrathoracic abnormality. 2. Small sliding hiatal hernia.  Electronically Signed By-Elias Ferguson DO. On:7/14/2020 8:25 PM This report was finalized on [id number] by  Elias Ferguson DO..    Patient undressed and placed in gown for exam.  Appropriate PPE worn during patient exam. 64-year-old male presents with complaint of midsternal chest pain described as constant radiates through to his back described as \"sore\" he reports this occurred while \"cutting 15 to 20 acres a hay and bouncing on the tractor\" he also reports " "subjective fever with nausea and a frontal headache that radiates around to the crown.  He reports he does have a history of sinusitis but it is been over a year since his last episode.  Pain is reproducible with palpation.  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  He does report mild dyspnea.  Reports that he drank lots of water, Powerade, and a Diet Coke to \"Try to ease my stomach\" he denies ill contact reports that he was negative for COVID in May.  He does report a first-degree familial history of CAD, his father who is  from [place].  He denies any medical history nor does he take daily medications.  He is never been followed by cardiology.  IV established and labs obtained.  Chest pain protocol initiated.  COVID test obtained, which was negative.White blood cell count was noted to be 2600 platelets 185 hemoglobin 13.7 hematocrit 39.8 patient was noted to be slightly dehydrated with sodium 133 chloride 95 and a slight bump in ALT at 42 and bilirubin at 1.5 d-dimer was elevated at 1.29, CT chest PE chest obtained which was negative.  COVID negative BNP was negative at 152.  Patient had nitro 0.4 mg sublingual x3 ordered PRN for chest pain.  Aspirin 324 mg given.  Vital signs stable.  Upon reassessment, patient reports relief with medications given.  Hospitalist paged for admission.  Spoke with BAKARI Patel accepted admission on behalf of Dr. Rodriguez.      Disposition/Treatment: Discussed results with patient, verbalized understanding.  Discussed reasons to return to the ER, patient verbalized understanding.  Agreeable with plan of care.  Patient was stable upon discharge.               Amount and/or Complexity of Data Reviewed  Clinical lab tests: reviewed  Tests in the radiology section of CPT®: reviewed  Tests in the medicine section of CPT®: reviewed  Decide to obtain previous medical " records or to obtain history from someone other than the patient: yes    Patient Progress  Patient progress: stable      Final diagnoses:   Chest pain, unspecified type   Neutropenia, unspecified type (CMS/HCC)   Dyspnea, unspecified type            Kimber Polanco NP  07/14/20 2051       Kimber Polanco NP  07/14/20 2052

## 2020-07-15 ENCOUNTER — APPOINTMENT (OUTPATIENT)
Dept: NUCLEAR MEDICINE | Facility: HOSPITAL | Age: 65
End: 2020-07-15

## 2020-07-15 ENCOUNTER — READMISSION MANAGEMENT (OUTPATIENT)
Dept: CALL CENTER | Facility: HOSPITAL | Age: 65
End: 2020-07-15

## 2020-07-15 VITALS
RESPIRATION RATE: 17 BRPM | TEMPERATURE: 99 F | BODY MASS INDEX: 32.48 KG/M2 | SYSTOLIC BLOOD PRESSURE: 122 MMHG | HEIGHT: 70 IN | OXYGEN SATURATION: 98 % | DIASTOLIC BLOOD PRESSURE: 72 MMHG | HEART RATE: 66 BPM | WEIGHT: 226.85 LBS

## 2020-07-15 PROBLEM — E87.1 HYPONATREMIA: Status: ACTIVE | Noted: 2020-07-15

## 2020-07-15 PROBLEM — E87.1 HYPONATREMIA: Status: RESOLVED | Noted: 2020-07-15 | Resolved: 2020-07-15

## 2020-07-15 PROBLEM — R07.9 CHEST PAIN: Status: RESOLVED | Noted: 2020-07-14 | Resolved: 2020-07-15

## 2020-07-15 PROBLEM — R50.9 FEBRILE ILLNESS: Status: ACTIVE | Noted: 2020-07-15

## 2020-07-15 LAB
ANION GAP SERPL CALCULATED.3IONS-SCNC: 12 MMOL/L (ref 5–15)
BASOPHILS # BLD AUTO: 0 10*3/MM3 (ref 0–0.2)
BASOPHILS NFR BLD AUTO: 0.6 % (ref 0–1.5)
BH CV NUCLEAR PRIOR STUDY: 3
BH CV STRESS BP STAGE 1: NORMAL
BH CV STRESS BP STAGE 2: NORMAL
BH CV STRESS BP STAGE 3: NORMAL
BH CV STRESS DURATION MIN STAGE 1: 3
BH CV STRESS DURATION MIN STAGE 2: 3
BH CV STRESS DURATION MIN STAGE 3: 3
BH CV STRESS DURATION SEC STAGE 1: 0
BH CV STRESS DURATION SEC STAGE 2: 0
BH CV STRESS DURATION SEC STAGE 3: 0
BH CV STRESS GRADE STAGE 1: 10
BH CV STRESS GRADE STAGE 2: 12
BH CV STRESS GRADE STAGE 3: 14
BH CV STRESS HR STAGE 1: 115
BH CV STRESS HR STAGE 2: 132
BH CV STRESS HR STAGE 3: 136
BH CV STRESS METS STAGE 1: 5
BH CV STRESS METS STAGE 2: 7.5
BH CV STRESS METS STAGE 3: 10
BH CV STRESS PROTOCOL 1: NORMAL
BH CV STRESS RECOVERY BP: NORMAL MMHG
BH CV STRESS RECOVERY HR: 85 BPM
BH CV STRESS SPEED STAGE 1: 1.7
BH CV STRESS SPEED STAGE 2: 2.5
BH CV STRESS SPEED STAGE 3: 3.4
BH CV STRESS STAGE 1: 1
BH CV STRESS STAGE 2: 2
BH CV STRESS STAGE 3: 3
BUN SERPL-MCNC: 11 MG/DL (ref 8–23)
BUN SERPL-MCNC: ABNORMAL MG/DL
BUN/CREAT SERPL: ABNORMAL
CALCIUM SPEC-SCNC: 8.7 MG/DL (ref 8.6–10.5)
CHLORIDE SERPL-SCNC: 99 MMOL/L (ref 98–107)
CO2 SERPL-SCNC: 24 MMOL/L (ref 22–29)
CREAT SERPL-MCNC: 1.17 MG/DL (ref 0.76–1.27)
DEPRECATED RDW RBC AUTO: 42.4 FL (ref 37–54)
EOSINOPHIL # BLD AUTO: 0 10*3/MM3 (ref 0–0.4)
EOSINOPHIL NFR BLD AUTO: 0.3 % (ref 0.3–6.2)
ERYTHROCYTE [DISTWIDTH] IN BLOOD BY AUTOMATED COUNT: 12.9 % (ref 12.3–15.4)
GFR SERPL CREATININE-BSD FRML MDRD: 63 ML/MIN/1.73
GLUCOSE SERPL-MCNC: 114 MG/DL (ref 65–99)
HCT VFR BLD AUTO: 38 % (ref 37.5–51)
HGB BLD-MCNC: 13.3 G/DL (ref 13–17.7)
LV EF NUC BP: 63 %
LYMPHOCYTES # BLD AUTO: 0.5 10*3/MM3 (ref 0.7–3.1)
LYMPHOCYTES NFR BLD AUTO: 13.7 % (ref 19.6–45.3)
MAXIMAL PREDICTED HEART RATE: 158 BPM
MCH RBC QN AUTO: 33 PG (ref 26.6–33)
MCHC RBC AUTO-ENTMCNC: 35.1 G/DL (ref 31.5–35.7)
MCV RBC AUTO: 94.1 FL (ref 79–97)
MONOCYTES # BLD AUTO: 0.7 10*3/MM3 (ref 0.1–0.9)
MONOCYTES NFR BLD AUTO: 21.4 % (ref 5–12)
NEUTROPHILS NFR BLD AUTO: 2.1 10*3/MM3 (ref 1.7–7)
NEUTROPHILS NFR BLD AUTO: 64 % (ref 42.7–76)
NRBC BLD AUTO-RTO: 0.2 /100 WBC (ref 0–0.2)
PERCENT MAX PREDICTED HR: 86.08 %
PLATELET # BLD AUTO: 166 10*3/MM3 (ref 140–450)
PMV BLD AUTO: 6.8 FL (ref 6–12)
POTASSIUM SERPL-SCNC: 4.2 MMOL/L (ref 3.5–5.2)
RBC # BLD AUTO: 4.04 10*6/MM3 (ref 4.14–5.8)
SODIUM SERPL-SCNC: 135 MMOL/L (ref 136–145)
STRESS BASELINE BP: NORMAL MMHG
STRESS BASELINE HR: 88 BPM
STRESS PERCENT HR: 101 %
STRESS POST ESTIMATED WORKLOAD: 10.1 METS
STRESS POST EXERCISE DUR MIN: 7 MIN
STRESS POST EXERCISE DUR SEC: 16 SEC
STRESS POST PEAK BP: NORMAL MMHG
STRESS POST PEAK HR: 136 BPM
STRESS TARGET HR: 134 BPM
TROPONIN T SERPL-MCNC: <0.01 NG/ML (ref 0–0.03)
TROPONIN T SERPL-MCNC: <0.01 NG/ML (ref 0–0.03)
WBC # BLD AUTO: 3.3 10*3/MM3 (ref 3.4–10.8)

## 2020-07-15 PROCEDURE — 99217 PR OBSERVATION CARE DISCHARGE MANAGEMENT: CPT | Performed by: INTERNAL MEDICINE

## 2020-07-15 PROCEDURE — 80048 BASIC METABOLIC PNL TOTAL CA: CPT | Performed by: STUDENT IN AN ORGANIZED HEALTH CARE EDUCATION/TRAINING PROGRAM

## 2020-07-15 PROCEDURE — 99244 OFF/OP CNSLTJ NEW/EST MOD 40: CPT | Performed by: INTERNAL MEDICINE

## 2020-07-15 PROCEDURE — 84484 ASSAY OF TROPONIN QUANT: CPT | Performed by: STUDENT IN AN ORGANIZED HEALTH CARE EDUCATION/TRAINING PROGRAM

## 2020-07-15 PROCEDURE — G0378 HOSPITAL OBSERVATION PER HR: HCPCS

## 2020-07-15 PROCEDURE — 96361 HYDRATE IV INFUSION ADD-ON: CPT

## 2020-07-15 PROCEDURE — 93017 CV STRESS TEST TRACING ONLY: CPT

## 2020-07-15 PROCEDURE — 0 TECHNETIUM SESTAMIBI: Performed by: INTERNAL MEDICINE

## 2020-07-15 PROCEDURE — A9500 TC99M SESTAMIBI: HCPCS | Performed by: INTERNAL MEDICINE

## 2020-07-15 PROCEDURE — 85025 COMPLETE CBC W/AUTO DIFF WBC: CPT | Performed by: STUDENT IN AN ORGANIZED HEALTH CARE EDUCATION/TRAINING PROGRAM

## 2020-07-15 PROCEDURE — 93018 CV STRESS TEST I&R ONLY: CPT | Performed by: NURSE PRACTITIONER

## 2020-07-15 PROCEDURE — 78452 HT MUSCLE IMAGE SPECT MULT: CPT | Performed by: INTERNAL MEDICINE

## 2020-07-15 PROCEDURE — 78452 HT MUSCLE IMAGE SPECT MULT: CPT

## 2020-07-15 RX ADMIN — ACETAMINOPHEN 650 MG: 325 TABLET, FILM COATED ORAL at 05:54

## 2020-07-15 RX ADMIN — TECHNETIUM TC 99M SESTAMIBI 1 DOSE: 1 INJECTION INTRAVENOUS at 07:25

## 2020-07-15 RX ADMIN — TECHNETIUM TC 99M SESTAMIBI 1 DOSE: 1 INJECTION INTRAVENOUS at 08:40

## 2020-07-15 RX ADMIN — ASPIRIN 81 MG 324 MG: 81 TABLET ORAL at 07:59

## 2020-07-15 RX ADMIN — Medication 10 ML: at 07:30

## 2020-07-15 NOTE — DISCHARGE SUMMARY
[facility] Medicine Services  DISCHARGE SUMMARY        Prepared For PCP:  Epley, James, APRN    Patient Name: Hiram Echevarria Jr.  : 1955  MRN: [id number]      Date of Admission:   2020    Date of Discharge:  7/15/2020    Length of stay:  LOS: 0 days     Hospital Course     Presenting Problem:   Neutropenia, unspecified type (CMS/HCC) [D70.9]  Dyspnea, unspecified type [R06.00]  Chest pain, unspecified type [R07.9]      Active Hospital Problems    Diagnosis  POA   • Febrile illness [R50.9]  Yes   • DOREEN (obstructive sleep apnea) [G47.33]  Yes   • Chronic GERD [K21.9]  Yes      Resolved Hospital Problems    Diagnosis Date Resolved POA   • **Chest pain [R07.9] 07/15/2020 Yes   • Hyponatremia [E87.1] 07/15/2020 Yes           Hospital Course:  Hiram Echevarria Jr. is a 64 y.o. male who presents to [facility] ED with a history of sleep apnea complaining of chest pain.  Patient states earlier this afternoon he began to have intermittent, sharp, substernal, chest pain that radiates to his back.  He reports he was nauseous and was having dry heaves but denies emesis.  He states he had significant chills but no reported fever.  Patient states there are no exacerbating or relieving factors.     In the ED, troponin negative, .3, d-dimer 1.29, glucose 130, sodium 133, ALT 42, T bili 1.5, white blood cells 2.60.  EKG shows sinus rhythm with rate 82.  Chest x-ray shows no active cardiopulmonary disease.  COVID test negative.  CT chest PE protocol shows no pulmonary embolus or acute intrathoracic abnormality with a small sliding hiatal hernia. Serial troponin were negative, no EKG changes noted, ruling out ACS. Myoview stress test was negative for any evidence of myocardial ischemia.  Patient continued to have some low grade fever and vague symptoms of nausea and stomach upset preceded his admission.  He has some symptoms of seasonal allergies as well, but not sinus tenderness. He  has no known COVID-19 exposure and has a negative test on this admission.  He has been advised to sequester himself at home for the next week and monitor his temperature and symptoms. If they get worse, he is to call his PCP or return to the ED for further evaluation.  As for his seasonal allergies, he has been instructed to use his OTC allergy medication to control symptoms and prevent congestion.  His febrile illness may URI or GI related, but seems to be mild and possibly just a nonspecific viral syndrome.    Recommendation for Outpatient Providers:             Reasons For Change In Medications and Indications for New Medications:        Day of Discharge     HPI: Patient denies any chest pain or shortness of air.  He does have some allergy symptoms.      Vital Signs:   Temp:  [98.6 °F (37 °C)-100.5 °F (38.1 °C)] 99 °F (37.2 °C)  Heart Rate:  [66-93] 66  Resp:  [14-18] 17  BP: (120-159)/(63-94) 122/72     Physical Exam:  General: well-developed and well-nourished, NAD  HEENT: NC/AT, EOMI, PERRLA, no sinus tenderness on palpation  Heart: RRR. No murmur   Chest: CTAB, no w/r/r, normal respiratory effort  Abdominal: Soft. NT/ND. Bowel sounds present  Musculoskeletal: Normal ROM.  No edema. No calf tenderness.  Neurological: AAOx3, no focal deficits  Skin: Skin is warm and dry. No rash  Psychiatric: Normal mood and affect.    Pertinent  and/or Most Recent Results     Results from last 7 days   Lab Units 07/15/20  0631 07/14/20  1833   WBC 10*3/mm3 3.30* 2.60*   HEMOGLOBIN g/dL 13.3 13.7   HEMATOCRIT % 38.0 39.8   PLATELETS 10*3/mm3 166 185   SODIUM mmol/L 135* 133*   POTASSIUM mmol/L 4.2 4.0   CHLORIDE mmol/L 99 95*   CO2 mmol/L 24.0 25.0   BUN  11 11   CREATININE mg/dL 1.17 1.23   GLUCOSE mg/dL 114* 130*   CALCIUM mg/dL 8.7 9.4     Results from last 7 days   Lab Units 07/14/20  1833   BILIRUBIN mg/dL 1.5*   ALK PHOS U/L 82   ALT (SGPT) U/L 42*   AST (SGOT) U/L 35           Invalid input(s): TG, LDLCALC,  LDLREALC  Results from last 7 days   Lab Units 07/15/20  0631 07/14/20  2346 07/14/20  1833   PROBNP pg/mL  --   --  152.3   TROPONIN T ng/mL <0.010 <0.010 <0.010       Brief Urine Lab Results  (Last result in the past 365 days)      Color   Clarity   Blood   Leuk Est   Nitrite   Protein   CREAT   Urine HCG        11/19/19 1112 Yellow  Comment:  REFERENCE RANGE: Yellow, Straw Clear See below:  Comment:  Small (1+) Negative Negative Negative               Microbiology Results Abnormal     Procedure Component Value - Date/Time    COVID-19 [facility] IN-HOUSE, Nasal Swab No Transport Media - Swab, Nasal Cavity [[id number]]  (Normal) Collected:  07/14/20 1835    Lab Status:  Final result Specimen:  Swab from Nasal Cavity Updated:  07/14/20 1920     COVID19 Not Detected    Narrative:       Fact sheet for providers: https://www.fda.gov/media/218584/download     Fact sheet for patients: https://www.fda.gov/media/765753/download          Xr Chest 1 View    Result Date: 7/14/2020  Impression: No active cardiopulmonary disease.   Electronically Signed By-Elias Ferguson DO. On:7/14/2020 6:38 PM This report was finalized on [id number] by  Elias Ferguson DO..    Ct Chest Pulmonary Embolism    Result Date: 7/14/2020  Impression:  1. No pulmonary embolus or acute intrathoracic abnormality. 2. Small sliding hiatal hernia.  Electronically Signed By-Elias Ferguson DO. On:7/14/2020 8:25 PM This report was finalized on [id number] by  Elias Ferguson DO..                             Test Results Pending at Discharge        Procedures Performed           Consults:   Consults     Date and Time Order Name Status Description    7/15/2020 0030 Cardiology (on-call MD unless specified)      7/14/2020 2040 Hospitalist (on-call MD unless specified) Completed             Discharge Details        Discharge Medications      Patient Not Prescribed Medications Upon Discharge         Allergies   Allergen Reactions   • Morphine Nausea Only          Discharge Disposition:  Home or Self Care    Diet:  Hospital:  Diet Order   Procedures   • Diet Regular         Discharge Activity:   Activity Instructions     Activity as Tolerated              CODE STATUS:    Code Status and Medical Interventions:   Ordered at: 07/14/20 2280     Code Status:    CPR     Medical Interventions (Level of Support Prior to Arrest):    Full         Follow-up Appointments  No future appointments.    Additional Instructions for the Follow-ups that You Need to Schedule     Call MD With Problems / Concerns   As directed      Instructions: Call [phone] or email [email or web address] for problems or concerns.    Order Comments:  Instructions: Call [phone] or email [email or web address] for problems or concerns.          Discharge Follow-up with PCP   As directed       Currently Documented PCP:    Epley, James, APRN    PCP Phone Number:    [phone]     Follow Up Details:  1 week                 Condition on Discharge:      Stable      This patient has been examined wearing appropriate Personal Protective Equipment. 07/15/20      Electronically signed by Patsy Lugo MD, 07/15/20, 1:32 PM.      Time: I spent  20  minutes on this discharge activity which included face-to-face encounter with the patient/reviewing the data in the system/coordination of the care with the nursing staff as well as consultants/documentation/entering orders.

## 2020-07-15 NOTE — PLAN OF CARE
Problem: Patient Care Overview  Goal: Plan of Care Review  Outcome: Ongoing (interventions implemented as appropriate)  Flowsheets  Taken 7/15/2020 0126  Progress: improving  Outcome Summary: new admit  patient is to have a myoview in the morning  will continue to monitor  Taken 7/14/2020 2249  Plan of Care Reviewed With: patient     Problem: Patient Care Overview  Goal: Discharge Needs Assessment  Outcome: Ongoing (interventions implemented as appropriate)  Flowsheets  Taken 7/15/2020 0126  Equipment Needed After Discharge: none  Anticipated Changes Related to Illness: none  Transportation Concerns: car, none  Concerns to be Addressed: no discharge needs identified  Readmission Within the Last 30 Days: no previous admission in last 30 days  Taken 7/14/2020 2245  Equipment Currently Used at Home: bipap/cpap  Taken 7/14/2020 2248  Transportation Anticipated: car, drives self;family or friend will provide  Patient/Family Anticipated Services at Transition: none  Patient/Family Anticipates Transition to: home     Problem: Cardiac: ACS (Acute Coronary Syndrome) (Adult)  Goal: Signs and Symptoms of Listed Potential Problems Will be Absent, Minimized or Managed (Cardiac: ACS)  Outcome: Ongoing (interventions implemented as appropriate)  Flowsheets (Taken 7/15/2020 0126)  Problems Assessed (Acute Coronary Syndrome): all  Problems Present (Acute Coronary Syn): none

## 2020-07-15 NOTE — H&P
[facility] Medicine Services      Patient Name: Hiram Echevarria Jr.  : 1955  MRN: [id number]  Primary Care Physician: Epley, James, APRN  Date of admission: 2020    Patient Care Team:  Epley, James, APRN as PCP - General (Family Medicine)  Shimon Barnes MD as Consulting Physician (Orthopedic Surgery)  Chuck Merrill MD as Consulting Physician (Orthopedic Surgery)          Subjective   History Present Illness     Chief Complaint:   Chief Complaint   Patient presents with   • Chest Pain       Mr. Echevarria is a 64 y.o. male who presents to [facility] ED with a history of sleep apnea complaining of chest pain.  Patient states earlier this afternoon he began to have intermittent, sharp, substernal, chest pain that radiates to his back.  He reports he was nauseous and was having dry heaves but denies emesis.  He states he had significant chills but no reported fever.  Patient states there are no exacerbating or relieving factors.    In the ED, troponin negative, .3, d-dimer 1.29, glucose 130, sodium 133, ALT 42, T bili 1.5, white blood cells 2.60.  EKG shows sinus rhythm with rate 82.  Chest x-ray shows no active cardiopulmonary disease.  COVID test negative.  CT chest PE protocol shows no pulmonary embolus or acute intrathoracic abnormality with a small sliding hiatal hernia.  Patient admitted for further evaluation and treatment.        Review of Systems   Constitution: Positive for chills. Negative for fever.   Cardiovascular: Positive for chest pain. Negative for leg swelling.   Musculoskeletal: Positive for back pain.   Gastrointestinal: Positive for nausea. Negative for vomiting.   All other systems reviewed and are negative.        Personal History     Past Medical History:   Past Medical History:   Diagnosis Date   • Asymptomatic microscopic hematuria 2018    negative  workkup   • Bruit     LEFT CAROTID ARTERY   • Colon polyps     FOLLOWED BY   SCARLET FERREIRA   • GERD (gastroesophageal reflux disease)    • Laceration of finger 05/16/2016    2ND FINGER, RIGHT HAND   • Laceration of finger 09/03/2012    LEFT THUMB, SEEN AT [facility]   • Neuropathy    • Neutropenia (CMS/HCC)    • OA (osteoarthritis)    • DOREEN (obstructive sleep apnea)     USES CPAP   • Paronychia of finger, right 05/2016    2ND FINGER   • PONV (postoperative nausea and vomiting)    • Right cataract    • Snoring    • Wears glasses        Surgical History:      Past Surgical History:   Procedure Laterality Date   • CARPAL TUNNEL RELEASE Left 1998   • CARPAL TUNNEL RELEASE Right 08/27/1999    DR. EDVIN ANDRADE AT [facility]   • COLONOSCOPY N/A 12/6/2018    (3) 5 MM SESSILE TUBULAR ADENOMA POLYPS, DIVERTICULOSIS, RESCOPE IN 3 YRS, DR. SCARLET FERREIRA AT [facility]   • COLONOSCOPY W/ POLYPECTOMY N/A 12/18/2013    5 MM ADENOMATOUS POLYP IN TRANSVERSE, DIVERTICULOSIS IN SIGMOID, RESCOPE IN 5 YRS, DR. SCARLET FERREIRA AT [facility]   • KNEE ARTHROSCOPY Right 06/21/2005    DR. TARUN JURADO AT [facility]   • KNEE MENISCAL REPAIR Left 08/13/2018    DR. SERGIO ANDREW   • LUMBAR DISC SURGERY Left 2002    L5-S1    • TONSILLECTOMY Bilateral    • TOTAL SHOULDER ARTHROPLASTY Right 09/30/2014    DR. SERGIO ANDREW AT [facility]         Family History: family history includes Diabetes in his father and mother; Heart disease in his father; Hypertension in his father and mother; Stroke in his father. Otherwise pertinent FHx was reviewed and unremarkable.     Social History:  reports that he has never smoked. He quit smokeless tobacco use about 9 years ago.  His smokeless tobacco use included chew. He reports that he drinks about 4.0 standard drinks of alcohol per week. He reports that he does not use drugs.      Medications:  Prior to Admission medications    Medication Sig Start Date End Date Taking? Authorizing Provider   fluticasone (FLONASE) 50 MCG/ACT nasal spray 2 sprays into the nostril(s) as directed by provider Daily. 3/13/19   Epley, James,  APRN   omeprazole (priLOSEC) 20 MG capsule Take 20 mg by mouth Daily.    Provider, Rowena, MD       Allergies:    Allergies   Allergen Reactions   • Morphine Nausea Only       Objective   Objective     Vital Signs  Temp:  [99.7 °F (37.6 °C)] 99.7 °F (37.6 °C)  Heart Rate:  [80-91] 88  Resp:  [14] 14  BP: (130-159)/(67-94) 131/75  SpO2:  [95 %-98 %] 95 %  on   ;      Body mass index is 32.52 kg/m².    Physical Exam   Constitutional: He is oriented to person, place, and time. He appears well-developed. No distress.   HENT:   Head: Normocephalic and atraumatic.   Mouth/Throat: Oropharynx is clear and moist.   Eyes: Pupils are equal, round, and reactive to light. Conjunctivae and EOM are normal.   Neck: Normal range of motion. Neck supple.   Cardiovascular: Normal rate, regular rhythm, normal heart sounds and intact distal pulses.   Pulmonary/Chest: Effort normal and breath sounds normal. No respiratory distress.   Abdominal: Soft. Bowel sounds are normal. He exhibits no distension.   Musculoskeletal: Normal range of motion. He exhibits no edema.   Neurological: He is alert and oriented to person, place, and time.   Skin: Skin is warm and dry. Capillary refill takes less than 2 seconds.   Psychiatric: He has a normal mood and affect. His behavior is normal. Judgment and thought content normal.   Vitals reviewed.      Results Review:  I have personally reviewed most recent cardiac tracings, lab results and radiology images and interpretations and agree with findings.    Results from last 7 days   Lab Units 07/14/20  1833   WBC 10*3/mm3 2.60*   HEMOGLOBIN g/dL 13.7   HEMATOCRIT % 39.8   PLATELETS 10*3/mm3 185     Results from last 7 days   Lab Units 07/14/20  1833   SODIUM mmol/L 133*   POTASSIUM mmol/L 4.0   CHLORIDE mmol/L 95*   CO2 mmol/L 25.0   BUN  11   CREATININE mg/dL 1.23   GLUCOSE mg/dL 130*   CALCIUM mg/dL 9.4   ALT (SGPT) U/L 42*   AST (SGOT) U/L 35   TROPONIN T ng/mL <0.010   PROBNP pg/mL 152.3          Estimated Creatinine Clearance: 74.9 mL/min (by C-G formula based on SCr of 1.23 mg/dL).  Brief Urine Lab Results  (Last result in the past 365 days)      Color   Clarity   Blood   Leuk Est   Nitrite   Protein   CREAT   Urine HCG        11/19/19 1112 Yellow  Comment:  REFERENCE RANGE: Yellow, Straw Clear See below:  Comment:  Small (1+) Negative Negative Negative               Microbiology Results (last 10 days)     Procedure Component Value - Date/Time    COVID-19 [facility] IN-HOUSE, Nasal Swab No Transport Media - Swab, Nasal Cavity [[id number]]  (Normal) Collected:  07/14/20 1835    Lab Status:  Final result Specimen:  Swab from Nasal Cavity Updated:  07/14/20 1920     COVID19 Not Detected    Narrative:       Fact sheet for providers: https://www.fda.gov/media/286344/download     Fact sheet for patients: https://www.fda.gov/media/956828/download          ECG/EMG Results (most recent)     Procedure Component Value Units Date/Time    ECG 12 Lead [[id number]] Collected:  07/14/20 1825     Updated:  07/14/20 1827    Narrative:       HEART RATE= 82  bpm  RR Interval= 736  ms  ND Interval= 160  ms  P Horizontal Axis= 17  deg  P Front Axis= 29  deg  QRSD Interval= 84  ms  QT Interval= 339  ms  QRS Axis= 63  deg  T Wave Axis= 48  deg  - NORMAL ECG -  Sinus rhythm  No previous ECG available for comparison  Electronically Signed By:   Date and Time of Study: 2020-07-14 18:25:59              Xr Chest 1 View    Result Date: 7/14/2020  No active cardiopulmonary disease.   Electronically Signed By-Elias Ferguson DO. On:7/14/2020 6:38 PM This report was finalized on [id number] by  Elias Ferguson DO..    Ct Chest Pulmonary Embolism    Result Date: 7/14/2020   1. No pulmonary embolus or acute intrathoracic abnormality. 2. Small sliding hiatal hernia.  Electronically Signed By-Elias Ferguson DO. On:7/14/2020 8:25 PM This report was finalized on [id number] by  Elias Ferguson DO..        Estimated Creatinine Clearance: 74.9  mL/min (by C-G formula based on SCr of 1.23 mg/dL).    Assessment/Plan   Assessment/Plan       Active Hospital Problems    Diagnosis  POA   • **Chest pain [R07.9]  Yes     Priority: High   • Hyponatremia [E87.1]  Yes     Priority: Low      Resolved Hospital Problems   No resolved problems to display.     Chest pain  -Received nitro and ASA in ER  -CXR and EKG reviewed  -Troponin negative, trend  -d dimer 1.29  -proBNP 152.3  -Continue cardiac monitoring  -Previous cardiology work-up more than 2 years previous, per patient was normal  -Consider Cardiology Consult  -Stress test ordered  -HH diet with 2 gm sodium; NPO at MN  -Continue NTG SL PRN for CP if systolic bp > 90  -Continue ASA    Hyponatremia, acute, mild   -Serum   -Patient appears dehydrated  -Recheck BMP in am      VTE Prophylaxis -   Mechanical Order History:     SCDs      Pharmalogical Order History:     None          CODE STATUS:    Code Status and Medical Interventions:   Ordered at: 07/14/20 0908     Code Status:    CPR     Medical Interventions (Level of Support Prior to Arrest):    Full       This patient has been examined wearing appropriate Personal Protective Equipment . 07/14/20      I discussed the patient's findings and my recommendations with patient, family and nursing staff.        Electronically signed by BAKARI Helton, 07/14/20, 8:50 PM.  [facility]ist Team

## 2020-07-15 NOTE — CONSULTS
Cardiology Consult Note    Patient Identification:  Name: Hiram Echevarria Jr.  Age: 64 y.o.  Sex: male  :  1955  MRN: [id number]             Requesting Physician : Hospitalist Dr. Lugo    Reason for Consultation / Chief Complaint : Chest pain    History of Present Illness:      This is a 64-year-old with PMH of    -Hypertension  -DOREEN on CPAP  -History of carotid bruit  -Used to chew tobacco quit 15 years ago.  -Allergy/intolerance to morphine  -Family history of CVA and MI at age 61 and father  -Surgical history of back surgery, knee surgery, shoulder replacement    Here with complaint of chest pain.  Patient apparently was mowing hay on a tractor had chest pain radiating to the back yesterday which was severe 10/10 with some nausea started at 3 PM and was relieved with narcotics in the ER.  Has been feeling heart and cold and chills with some cough denies any hemoptysis wheezing.    Work-up here revealed troponin x2- labs revealed mildly elevated glucose at 114..  D-dimer was elevated but CT PE study was negative for PE.  Had small sliding hiatal hernia.  EKG from 2020 reviewed by me shows sinus rhythm with rate of 82 bpm with no acute ST-T changes..        Assessment:      -Chest pain of unclear etiology  -Cough shortness of breath and low-grade fever  -Hypertension  -Carotid bruit  -DOREEN    Recommendations / Plan:        Telemetry is revealing sinus rhythm  EKG is unremarkable.  Serial cardiac enzymes are negative for MI.  Will proceed with stress Cardiolite.  Risk benefits alternatives explained.    Patient underwent stress Cardiolite which is negative for ischemia.  Reviewed results with nurse taking care of patient  Discussed results with patient advised him to follow-up with me as outpatient if he has recurrent symptoms  Will defer to hospitalist to evaluate and treat patient's cough and fever             Diagnosis Plan   1. Chest pain, unspecified type     2. Neutropenia, unspecified type  (CMS/HCC)     3. Dyspnea, unspecified type                Past Medical History:  Past Medical History:   Diagnosis Date   • Asymptomatic microscopic hematuria 03/2018    negative  workkup   • Bruit     LEFT CAROTID ARTERY   • Colon polyps     FOLLOWED BY DR. SCARLET FERREIRA   • GERD (gastroesophageal reflux disease)    • Laceration of finger 05/16/2016    2ND FINGER, RIGHT HAND   • Laceration of finger 09/03/2012    LEFT THUMB, SEEN AT [facility]   • Neuropathy    • Neutropenia (CMS/HCC)    • OA (osteoarthritis)    • DOREEN (obstructive sleep apnea)     USES CPAP   • Paronychia of finger, right 05/2016    2ND FINGER   • PONV (postoperative nausea and vomiting)    • Right cataract    • Snoring    • Wears glasses      Past Surgical History:  Past Surgical History:   Procedure Laterality Date   • CARPAL TUNNEL RELEASE Left 1998   • CARPAL TUNNEL RELEASE Right 08/27/1999    DR. EDVIN ANDRADE AT [facility]   • COLONOSCOPY N/A 12/6/2018    (3) 5 MM SESSILE TUBULAR ADENOMA POLYPS, DIVERTICULOSIS, RESCOPE IN 3 YRS, DR. SCARLET FERREIRA AT [facility]   • COLONOSCOPY W/ POLYPECTOMY N/A 12/18/2013    5 MM ADENOMATOUS POLYP IN TRANSVERSE, DIVERTICULOSIS IN SIGMOID, RESCOPE IN 5 YRS, DR. SCARLET FERREIRA AT [facility]   • KNEE ARTHROSCOPY Right 06/21/2005    DR. TARUN JURADO AT [facility]   • KNEE MENISCAL REPAIR Left 08/13/2018    DR. SERGIO ANDREW   • LUMBAR DISC SURGERY Left 2002    L5-S1    • TONSILLECTOMY Bilateral    • TOTAL SHOULDER ARTHROPLASTY Right 09/30/2014    DR. SERGIO ANDREW AT [facility]      Allergies:  Allergies   Allergen Reactions   • Morphine Nausea Only     Home Meds:  No medications prior to admission.     Current Meds:     Current Facility-Administered Medications:   •  acetaminophen (TYLENOL) tablet 650 mg, 650 mg, Oral, Q4H PRN, 650 mg at 07/15/20 0554 **OR** acetaminophen (TYLENOL) 160 MG/5ML solution 650 mg, 650 mg, Oral, Q4H PRN **OR** acetaminophen (TYLENOL) suppository 650 mg, 650 mg, Rectal, Q4H PRN, Negar Burnette APRN  •  aspirin  chewable tablet 324 mg, 324 mg, Oral, Daily, Negar Burnette, APRN, 324 mg at 07/15/20 0759  •  bisacodyl (DULCOLAX) suppository 10 mg, 10 mg, Rectal, Daily PRN, Vincenzo Burnettea, APRN  •  docusate sodium (COLACE) capsule 100 mg, 100 mg, Oral, BID PRN, Vasile Negar, APRN  •  melatonin tablet 5 mg, 5 mg, Oral, Nightly PRN, Vasile Negar, APRN  •  nitroglycerin (NITROSTAT) SL tablet 0.4 mg, 0.4 mg, Sublingual, Q5 Min PRN, Negar Burnette, APRN, 0.4 mg at 07/14/20 1939  •  ondansetron (ZOFRAN) tablet 4 mg, 4 mg, Oral, Q6H PRN **OR** ondansetron (ZOFRAN) injection 4 mg, 4 mg, Intravenous, Q6H PRN, Vasile Negar, APRN  •  sodium chloride 0.9 % flush 10 mL, 10 mL, Intravenous, PRN, Kimber Polanco, NP  •  sodium chloride 0.9 % flush 10 mL, 10 mL, Intravenous, Q12H, Vasile Negar, APRN, 10 mL at 07/15/20 0730  •  sodium chloride 0.9 % flush 10 mL, 10 mL, Intravenous, PRN, Vasile Negra, APRN  •  sodium chloride 0.9 % infusion, 100 mL/hr, Intravenous, Continuous, Vincenzo Burnettea, APRN, Stopped at 07/15/20 0730  Social History:   Social History     Tobacco Use   • Smoking status: Never Smoker   • Smokeless tobacco: Former User     Types: Chew   Substance Use Topics   • Alcohol use: Yes     Alcohol/week: 4.0 standard drinks     Types: 4 Shots of liquor per week      Family History:  Family History   Problem Relation Age of Onset   • Hypertension Mother    • Diabetes Mother    • Hypertension Father    • Diabetes Father    • Stroke Father    • Heart disease Father    • Colon cancer Neg Hx    • Prostate cancer Neg Hx         Review of Systems : Review of Systems   Constitution: Positive for chills and fever. Negative for weight gain and weight loss.   HENT: Negative for nosebleeds.    Cardiovascular: Positive for chest pain. Negative for dyspnea on exertion, near-syncope, palpitations and syncope.   Respiratory: Positive for cough. Negative for hemoptysis and shortness of breath.    Endocrine: Negative for cold  "intolerance, heat intolerance, polydipsia and polyphagia.   Hematologic/Lymphatic: Does not bruise/bleed easily.   Skin: Negative for rash.   Musculoskeletal: Positive for arthritis and back pain.   Gastrointestinal: Positive for nausea. Negative for diarrhea, hematochezia, melena and vomiting.   Genitourinary: Negative for dysuria and hematuria.   Neurological: Negative for dizziness and seizures.   Psychiatric/Behavioral: Negative for altered mental status.               Constitutional:  Temp:  [98.6 °F (37 °C)-100.5 °F (38.1 °C)] 100.5 °F (38.1 °C)  Heart Rate:  [75-93] 93  Resp:  [14-18] 18  BP: (120-159)/(63-94) 135/74    Physical Exam   /74 (BP Location: Right arm, Patient Position: Lying)   Pulse 93   Temp 100.5 °F (38.1 °C) (Oral)   Resp 18   Ht 177.8 cm (70\")   Wt 103 kg (226 lb 13.7 oz)   SpO2 99%   BMI 32.55 kg/m²   Physical Exam  General:  Appears in no acute distress  Eyes: Sclera is anicteric,  conjunctiva is clear   HEENT:  No JVD. Thyroid not visibly enlarged. No mucosal pallor or cyanosis  Respiratory: Respirations regular and unlabored at rest.  Bilaterally good breath sounds, with good air entry in all fields. No crackles, rubs or wheezes auscultated  Cardiovascular: S1,S2 Regular rate and rhythm. No murmur, rub or gallop auscultated. No pretibial pitting edema  Gastrointestinal: Abdomen soft, flat, non tender. Bowel sounds present.   Musculoskeletal:  No abnormal movements  Extremities: No digital clubbing or cyanosis  Skin: Color pink. Skin warm and dry to touch. No rashes  No xanthoma  Neuro: Alert and awake, no lateralizing deficits appreciated    Cardiographics  ECG: EKG tracing was  personally reviewed by me  ECG 12 Lead   Preliminary Result   HEART RATE= 82  bpm   RR Interval= 736  ms   FL Interval= 160  ms   P Horizontal Axis= 17  deg   P Front Axis= 29  deg   QRSD Interval= 84  ms   QT Interval= 339  ms   QRS Axis= 63  deg   T Wave Axis= 48  deg   - NORMAL ECG -   Sinus " rhythm   No previous ECG available for comparison   Electronically Signed By:    Date and Time of Study: 2020-07-14 18:25:59          Telemetry: Sinus rhythm    Echocardiogram:        Imaging  Chest X-ray:   Imaging Results (Last 24 Hours)     Procedure Component Value Units Date/Time    CT Chest Pulmonary Embolism [[id number]] Collected:  07/14/20 2020     Updated:  07/14/20 2028    Narrative:          DATE OF EXAM:  7/14/2020 8:00 PM     PROCEDURE:  CT CHEST PULMONARY EMBOLISM-     INDICATIONS:   dyspnea, elevated d-dimer 64-year-old male sternal chest pain radiating  posteriorly     COMPARISON:   Portable chest radiograph dated 07/14/2020     TECHNIQUE:  Routine transaxial slices were obtained through the chest after the  intravenous administration of 100 mL of Isovue 370. Reconstructed  coronal and sagittal images were also obtained. Automated exposure  control and iterative construction methods were used.        FINDINGS:  Pulmonary arteries are normal in caliber and well opacified without  pulmonary embolus.  The thoracic aorta is normal in course and caliber.   No suspicious pulmonary nodules or consolidations. No interstitial  thickening or bronchiectasis. The tracheobronchial tree is widely  patent. No endobronchial lesions are identified. No pleural or  pericardial effusions. No pneumothorax.  There is a small sliding hiatal  hernia. No thoracic or axillary lymphadenopathy. Visualized bony  structures are intact. No aggressive focal osseous lesions or acute bony  abnormalities. There are partially bridging osteophytes in the thoracic  spine. Thyroid gland is normal in size.  Limited images of the upper  abdomen are unremarkable          Impression:          1. No pulmonary embolus or acute intrathoracic abnormality.  2. Small sliding hiatal hernia.     Electronically Signed By-Elias Ferguson DO. On:7/14/2020 8:25 PM  This report was finalized on [id number] by  Elias Ferguson DO..    XR Chest 1 View  [[id number]] Collected:  07/14/20 1837     Updated:  07/14/20 1840    Narrative:       XR CHEST 1 VW-     Date of Exam: 7/14/2020 6:25 PM     Indication: Chest pain protocol  chest pain and shortness of breath body  aches and chills, rule out Covid     Comparison: None available.     Technique: 1 view(s) of the chest were obtained.     FINDINGS: The lungs are well expanded. Cardiac, hilar and  mediastinal silhouettes are normal. No pneumothorax, pleural effusion or  focal pulmonary parenchymal opacity. Pulmonary vascularity is within  normal limits. The trachea is midline. Visualized bony structures are  intact.  There is a prosthetic right humeral head.       Impression:       No active cardiopulmonary disease.        Electronically Signed By-Elias Ferguson DO. On:7/14/2020 6:38 PM  This report was finalized on [id number] by  Elias Ferguson DO..          Lab Review: I have reviewed the labs  Results from last 7 days   Lab Units 07/15/20  0631 07/14/20  2346 07/14/20  1833   TROPONIN T ng/mL <0.010 <0.010 <0.010         Results from last 7 days   Lab Units 07/15/20  0631 07/14/20  1833   SODIUM mmol/L 135* 133*   POTASSIUM mmol/L 4.2 4.0   BUN   --  11   CREATININE mg/dL 1.17 1.23   CALCIUM mg/dL 8.7 9.4     @[id number]@  Results from last 7 days   Lab Units 07/15/20  0631 07/14/20  1833   WBC 10*3/mm3 3.30* 2.60*   HEMOGLOBIN g/dL 13.3 13.7   HEMATOCRIT % 38.0 39.8   PLATELETS 10*3/mm3 166 185                 Han Dobson MD  7/15/2020, 10:16      EMR Dragon/Transcription:   Dictated utilizing Dragon dictation

## 2020-07-15 NOTE — PROGRESS NOTES
Case Management Discharge Note      Final Note: DC orders in at this time.    Final Discharge Disposition Code: 01 - home or self-care

## 2020-07-15 NOTE — PROGRESS NOTES
Discharge Planning Assessment  [facility]     Patient Name: Hiram Echevarria Jr.  MRN: [id number]  Today's Date: 7/15/2020    Admit Date: 7/14/2020    Discharge Needs Assessment     Row Name 07/15/20 0952       Living Environment    Lives With  spouse    Name(s) of Who Lives With Patient  Cammy    Current Living Arrangements  home/apartment/condo House    Primary Care Provided by  self    Provides Primary Care For  no one    Family Caregiver if Needed  spouse    Quality of Family Relationships  supportive    Able to Return to Prior Arrangements  yes       Resource/Environmental Concerns    Resource/Environmental Concerns  none    Transportation Concerns  car, none       Transition Planning    Patient/Family Anticipates Transition to  home with family    Patient/Family Anticipated Services at Transition  none    Transportation Anticipated  family or friend will provide;car, drives self       Discharge Needs Assessment    Readmission Within the Last 30 Days  no previous admission in last 30 days    Concerns to be Addressed  no discharge needs identified;denies needs/concerns at this time    Equipment Currently Used at Home  bipap/cpap Cpap, does not think it is through any certain company at this point     Anticipated Changes Related to Illness  none    Equipment Needed After Discharge  none    Provided Post Acute Provider List?  Refused    Discharge Coordination/Progress  PCP James Epley, reports no trouble affording medications at this time.        Discharge Plan     Row Name 07/15/20 1000       Plan    Plan  DC Plan: Anticipate routine home, denies needs at this time.    Patient/Family in Agreement with Plan  yes    Plan Comments  Due to COVID 19 pandemic, called into patient's room to discuss dc planning. No needs identified at this time. DC Barriers: Myoview pending.          Demographic Summary     Row Name 07/15/20 0952       General Information    Admission Type  observation    Reason for Consult  discharge  planning    Preferred Language  English     Used During This Interaction  no       Contact Information    Permission Granted to Share Info With          Ingrid Jaffe RN       Office Phone: [phone]  Office Cell: [phone]

## 2020-07-15 NOTE — PLAN OF CARE
Problem: Patient Care Overview  Goal: Plan of Care Review  Outcome: Ongoing (interventions implemented as appropriate)  Flowsheets (Taken 7/15/2020 1011)  Progress: improving  Plan of Care Reviewed With: patient  Outcome Summary: pt had myoview this morning which was negative per Dr. Dobson. pt will be discharged home.

## 2020-07-16 ENCOUNTER — TRANSITIONAL CARE MANAGEMENT TELEPHONE ENCOUNTER (OUTPATIENT)
Dept: CALL CENTER | Facility: HOSPITAL | Age: 65
End: 2020-07-16

## 2020-07-16 NOTE — OUTREACH NOTE
Call Center TCM Note      Responses   [facility] patient discharged from?  [facility]   COVID-19 Test Status  Negative   Does the patient have one of the following disease processes/diagnoses(primary or secondary)?  Other   TCM attempt successful?  Yes   Call start time  1455   Call Status  Left message [on Shawna ]   Call end time  1502   Discharge diagnosis  chest pain, s/p stress test   Is patient permission given to speak with other caregiver?  Yes   Person spoke with today (if not patient) and relationship  Cammy-spouse    Meds reviewed with patient/caregiver?  Yes   Is the patient having any side effects they believe may be caused by any medication additions or changes?  No   Does the patient have all medications ordered at discharge?  N/A   Is the patient taking all medications as directed (includes completed medication regime)?  N/A [Pt takes no meds]   Does the patient have a primary care provider?   Yes   Does the patient have an appointment with their PCP within 7 days of discharge?  Yes   Comments regarding PCP  7/21/20 at 1:30 pm    Has the patient kept scheduled appointments due by today?  N/A   Pulse Ox monitoring  None   Psychosocial issues?  No   Did the patient receive a copy of their discharge instructions?  Yes   Nursing interventions  Reviewed instructions with patient   What is the patient's perception of their health status since discharge?  Improving   Is the patient/caregiver able to teach back signs and symptoms related to disease process for when to call PCP?  Yes   Is the patient/caregiver able to teach back signs and symptoms related to disease process for when to call 911?  Yes   Is the patient/caregiver able to teach back the hierarchy of who to call/visit for symptoms/problems? PCP, Specialist, Home health nurse, Urgent Care, ED, 911  Yes   If the patient is a current smoker, are they able to teach back resources for cessation?  -- [Nonsmoker]   TCM call completed?  Yes           Esther Osorio, JOYCE    7/16/2020, 15:03

## 2020-07-16 NOTE — OUTREACH NOTE
Call Center TCM Note      Responses   [facility] patient discharged from?  [facility]   COVID-19 Test Status  Negative   Does the patient have one of the following disease processes/diagnoses(primary or secondary)?  Other   TCM attempt successful?  Yes   Call start time  1653   Discharge diagnosis  chest pain, s/p stress test          Esther Osorio RN    7/16/2020, 16:54

## 2020-07-16 NOTE — OUTREACH NOTE
Prep Survey      Responses   [facility] patient discharged from?  [facility]   Is LACE score < 7 ?  Yes   Eligibility  [facility]   Date of Admission  07/14/20   Date of Discharge  07/15/20   Discharge Disposition  Home or Self Care   Discharge diagnosis  chest pain, s/p stress test   COVID-19 Test Status  Negative   Does the patient have one of the following disease processes/diagnoses(primary or secondary)?  Other   Does the patient have Home health ordered?  No   Is there a DME ordered?  No   Prep survey completed?  Yes          Erin Doll, RN

## 2021-02-02 ENCOUNTER — APPOINTMENT (OUTPATIENT)
Dept: MRI IMAGING | Facility: HOSPITAL | Age: 66
End: 2021-02-02

## 2021-02-02 ENCOUNTER — APPOINTMENT (OUTPATIENT)
Dept: GENERAL RADIOLOGY | Facility: HOSPITAL | Age: 66
End: 2021-02-02

## 2021-02-02 ENCOUNTER — HOSPITAL ENCOUNTER (EMERGENCY)
Facility: HOSPITAL | Age: 66
Discharge: HOME | End: 2021-02-02
Admitting: EMERGENCY MEDICINE

## 2021-02-02 VITALS
WEIGHT: 225.31 LBS | TEMPERATURE: 97.9 F | RESPIRATION RATE: 18 BRPM | HEART RATE: 71 BPM | SYSTOLIC BLOOD PRESSURE: 156 MMHG | HEIGHT: 70 IN | OXYGEN SATURATION: 99 % | DIASTOLIC BLOOD PRESSURE: 93 MMHG | BODY MASS INDEX: 32.26 KG/M2

## 2021-02-02 DIAGNOSIS — R51.9 INTRACTABLE HEADACHE, UNSPECIFIED CHRONICITY PATTERN, UNSPECIFIED HEADACHE TYPE: Primary | ICD-10-CM

## 2021-02-02 LAB
ALBUMIN SERPL-MCNC: 4.5 G/DL (ref 3.5–5.2)
ALBUMIN/GLOB SERPL: 1.8 G/DL
ALP SERPL-CCNC: 89 U/L (ref 39–117)
ALT SERPL W P-5'-P-CCNC: 42 U/L (ref 1–41)
ANION GAP SERPL CALCULATED.3IONS-SCNC: 8 MMOL/L (ref 5–15)
AST SERPL-CCNC: 39 U/L (ref 1–40)
BASOPHILS # BLD AUTO: 0 10*3/MM3 (ref 0–0.2)
BASOPHILS NFR BLD AUTO: 1 % (ref 0–1.5)
BILIRUB SERPL-MCNC: 0.8 MG/DL (ref 0–1.2)
BUN SERPL-MCNC: 11 MG/DL (ref 8–23)
BUN/CREAT SERPL: 12.2 (ref 7–25)
CALCIUM SPEC-SCNC: 9.3 MG/DL (ref 8.6–10.5)
CHLORIDE SERPL-SCNC: 101 MMOL/L (ref 98–107)
CO2 SERPL-SCNC: 27 MMOL/L (ref 22–29)
COHGB MFR BLD: 1.7 % (ref 0–3)
CREAT SERPL-MCNC: 0.9 MG/DL (ref 0.76–1.27)
DEPRECATED RDW RBC AUTO: 45.5 FL (ref 37–54)
EOSINOPHIL # BLD AUTO: 0.2 10*3/MM3 (ref 0–0.4)
EOSINOPHIL NFR BLD AUTO: 3.1 % (ref 0.3–6.2)
ERYTHROCYTE [DISTWIDTH] IN BLOOD BY AUTOMATED COUNT: 13.7 % (ref 12.3–15.4)
ERYTHROCYTE [SEDIMENTATION RATE] IN BLOOD: 10 MM/HR (ref 0–20)
GFR SERPL CREATININE-BSD FRML MDRD: 85 ML/MIN/1.73
GLOBULIN UR ELPH-MCNC: 2.5 GM/DL
GLUCOSE SERPL-MCNC: 94 MG/DL (ref 65–99)
HCT VFR BLD AUTO: 39 % (ref 37.5–51)
HGB BLD-MCNC: 13.7 G/DL (ref 13–17.7)
LYMPHOCYTES # BLD AUTO: 1.8 10*3/MM3 (ref 0.7–3.1)
LYMPHOCYTES NFR BLD AUTO: 37.1 % (ref 19.6–45.3)
MCH RBC QN AUTO: 33.5 PG (ref 26.6–33)
MCHC RBC AUTO-ENTMCNC: 35 G/DL (ref 31.5–35.7)
MCV RBC AUTO: 95.7 FL (ref 79–97)
MONOCYTES # BLD AUTO: 0.5 10*3/MM3 (ref 0.1–0.9)
MONOCYTES NFR BLD AUTO: 9.9 % (ref 5–12)
NEUTROPHILS NFR BLD AUTO: 2.4 10*3/MM3 (ref 1.7–7)
NEUTROPHILS NFR BLD AUTO: 48.9 % (ref 42.7–76)
NRBC BLD AUTO-RTO: 0.1 /100 WBC (ref 0–0.2)
NT-PROBNP SERPL-MCNC: 50.2 PG/ML (ref 0–900)
PLATELET # BLD AUTO: 245 10*3/MM3 (ref 140–450)
PMV BLD AUTO: 6.8 FL (ref 6–12)
POTASSIUM SERPL-SCNC: 4.2 MMOL/L (ref 3.5–5.2)
PROT SERPL-MCNC: 7 G/DL (ref 6–8.5)
RBC # BLD AUTO: 4.08 10*6/MM3 (ref 4.14–5.8)
SODIUM SERPL-SCNC: 136 MMOL/L (ref 136–145)
TROPONIN T SERPL-MCNC: <0.01 NG/ML (ref 0–0.03)
WBC # BLD AUTO: 5 10*3/MM3 (ref 3.4–10.8)

## 2021-02-02 PROCEDURE — 84484 ASSAY OF TROPONIN QUANT: CPT | Performed by: PHYSICIAN ASSISTANT

## 2021-02-02 PROCEDURE — 25010000002 PROCHLORPERAZINE 10 MG/2ML SOLUTION: Performed by: PHYSICIAN ASSISTANT

## 2021-02-02 PROCEDURE — 25010000002 DIPHENHYDRAMINE PER 50 MG: Performed by: PHYSICIAN ASSISTANT

## 2021-02-02 PROCEDURE — 83880 ASSAY OF NATRIURETIC PEPTIDE: CPT | Performed by: PHYSICIAN ASSISTANT

## 2021-02-02 PROCEDURE — 82375 ASSAY CARBOXYHB QUANT: CPT | Performed by: PHYSICIAN ASSISTANT

## 2021-02-02 PROCEDURE — 93005 ELECTROCARDIOGRAM TRACING: CPT | Performed by: PHYSICIAN ASSISTANT

## 2021-02-02 PROCEDURE — 36415 COLL VENOUS BLD VENIPUNCTURE: CPT

## 2021-02-02 PROCEDURE — 99284 EMERGENCY DEPT VISIT MOD MDM: CPT

## 2021-02-02 PROCEDURE — 85651 RBC SED RATE NONAUTOMATED: CPT | Performed by: PHYSICIAN ASSISTANT

## 2021-02-02 PROCEDURE — 96375 TX/PRO/DX INJ NEW DRUG ADDON: CPT

## 2021-02-02 PROCEDURE — 85025 COMPLETE CBC W/AUTO DIFF WBC: CPT | Performed by: PHYSICIAN ASSISTANT

## 2021-02-02 PROCEDURE — 71045 X-RAY EXAM CHEST 1 VIEW: CPT

## 2021-02-02 PROCEDURE — 80053 COMPREHEN METABOLIC PANEL: CPT | Performed by: PHYSICIAN ASSISTANT

## 2021-02-02 PROCEDURE — 70551 MRI BRAIN STEM W/O DYE: CPT

## 2021-02-02 PROCEDURE — 96374 THER/PROPH/DIAG INJ IV PUSH: CPT

## 2021-02-02 RX ORDER — PROCHLORPERAZINE EDISYLATE 5 MG/ML
10 INJECTION INTRAMUSCULAR; INTRAVENOUS ONCE
Status: COMPLETED | OUTPATIENT
Start: 2021-02-02 | End: 2021-02-02

## 2021-02-02 RX ORDER — DIPHENHYDRAMINE HYDROCHLORIDE 50 MG/ML
25 INJECTION INTRAMUSCULAR; INTRAVENOUS ONCE
Status: COMPLETED | OUTPATIENT
Start: 2021-02-02 | End: 2021-02-02

## 2021-02-02 RX ORDER — SODIUM CHLORIDE 0.9 % (FLUSH) 0.9 %
10 SYRINGE (ML) INJECTION AS NEEDED
Status: DISCONTINUED | OUTPATIENT
Start: 2021-02-02 | End: 2021-02-02 | Stop reason: HOSPADM

## 2021-02-02 RX ADMIN — PROCHLORPERAZINE EDISYLATE 10 MG: 5 INJECTION INTRAMUSCULAR; INTRAVENOUS at 17:07

## 2021-02-02 RX ADMIN — DIPHENHYDRAMINE HYDROCHLORIDE 25 MG: 50 INJECTION, SOLUTION INTRAMUSCULAR; INTRAVENOUS at 17:03

## 2021-02-02 NOTE — ED NOTES
"Pt states that he is a  and today while he was driving there was a suspected CO2 leak into the cab of the truck. He states that he started to feel lightheaded, dizzy, and nauseous. He also states that while talking to his boss on the phone, his boss told him that he \"sounded confused\". Pt vitals being monitored, no obvious signs of distress currently, will continue to monitor at this time.      Orly Gupta RN  02/02/21 1819    "

## 2021-02-02 NOTE — DISCHARGE INSTRUCTIONS
Tylenol or ibuprofen as needed for pain.  Drink plenty of clear fluids over the next 2 to 3 days.    Follow-up with your primary care provider in 3-5 days.  If you do not have a primary care provider call [phone] for help in finding one, or you may follow up with [facility] at [phone].    Return to ED for any new or worsening symptoms

## 2021-02-02 NOTE — ED PROVIDER NOTES
"Subjective   Patient is a 65-year-old male who presents stating that he may have some carbon monoxide poisoning.  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  Patient states that the stroke was recently getting worked on his boss and they took her to the shop states he did have his window down intermittently and was in and out of the truck.  Reports some associated shortness of breath earlier today denies any currently.  Patient currently has a headache along his forehead that radiates back to the base of his head currently rates an 8/10 severity.  Describes as a throbbing type pain he reports general onset denies thunderclap or worst take of his life.  He denies any sensitivity to light or loud noise.  She denies any one-sided numbness weakness or speech or facial droop.  Does report a dry cough since being in the truck earlier and some intermittent nausea that he denies currently.  Patient denies any chest pain vomiting abdominal pain or urinary symptoms.  He said when he got out of the truck he could \" smell the exhaust on me\"          Review of Systems   Constitutional: Negative.    Eyes: Negative for photophobia and visual disturbance.   Respiratory: Positive for shortness of breath. Negative for apnea, cough, choking, chest tightness, wheezing and stridor.    Cardiovascular: Negative.    Gastrointestinal: Positive for nausea. Negative for abdominal distention, abdominal pain, constipation, diarrhea and vomiting.   Genitourinary: Negative.    Musculoskeletal: Negative for neck pain and neck stiffness.   Skin: Negative.    Neurological: Positive for dizziness, light-headedness and headaches. Negative for tremors, seizures, syncope, facial asymmetry, speech difficulty, weakness and numbness.       Past Medical History:   Diagnosis Date   • Asymptomatic microscopic hematuria 03/2018    negative  workkup   • Bruit     LEFT CAROTID ARTERY "   • Colon polyps     FOLLOWED BY DR. SCARLET FERREIRA   • GERD (gastroesophageal reflux disease)    • Laceration of finger 05/16/2016    2ND FINGER, RIGHT HAND   • Laceration of finger 09/03/2012    LEFT THUMB, SEEN AT [facility]   • Neuropathy    • Neutropenia (CMS/HCC)    • OA (osteoarthritis)    • DOREEN (obstructive sleep apnea)     USES CPAP   • Paronychia of finger, right 05/2016    2ND FINGER   • PONV (postoperative nausea and vomiting)    • Right cataract    • Snoring    • Wears glasses        Allergies   Allergen Reactions   • Morphine Nausea Only       Past Surgical History:   Procedure Laterality Date   • CARPAL TUNNEL RELEASE Left 1998   • CARPAL TUNNEL RELEASE Right 08/27/1999    DR. EDVIN ANDRADE AT [place]   • COLONOSCOPY N/A 12/6/2018    (3) 5 MM SESSILE TUBULAR ADENOMA POLYPS, DIVERTICULOSIS, RESCOPE IN 3 YRS, DR. SCARLET FERREIRA AT [facility]   • COLONOSCOPY W/ POLYPECTOMY N/A 12/18/2013    5 MM ADENOMATOUS POLYP IN TRANSVERSE, DIVERTICULOSIS IN SIGMOID, RESCOPE IN 5 YRS, DR. SCARLET FERREIRA AT [facility]   • KNEE ARTHROSCOPY Right 06/21/2005    DR. TARUN JURADO AT [place]   • KNEE MENISCAL REPAIR Left 08/13/2018    DR. SERGIO ANDREW   • LUMBAR DISC SURGERY Left 2002    L5-S1    • TONSILLECTOMY Bilateral    • TOTAL SHOULDER ARTHROPLASTY Right 09/30/2014    DR. SERGIO ANDREW AT [place]       Family History   Problem Relation Age of Onset   • Hypertension Mother    • Diabetes Mother    • Hypertension Father    • Diabetes Father    • Stroke Father    • Heart disease Father    • Colon cancer Neg Hx    • Prostate cancer Neg Hx        Social History     Socioeconomic History   • Marital status:      Spouse name: Not on file   • Number of children: Not on file   • Years of education: Not on file   • Highest education level: Not on file   Tobacco Use   • Smoking status: Never Smoker   • Smokeless tobacco: Former User     Types: Chew   Substance and Sexual Activity   • Alcohol use: Yes     Alcohol/week: 4.0 standard drinks     Types:  4 Shots of liquor per week   • Drug use: No   • Sexual activity: Defer           Objective   Physical Exam  Vitals signs and nursing note reviewed.   Constitutional:       General: He is not in acute distress.     Appearance: He is well-developed. He is not ill-appearing, toxic-appearing or diaphoretic.   HENT:      Head: Normocephalic and atraumatic.      Nose: Nose normal.      Mouth/Throat:      Mouth: Mucous membranes are moist.      Pharynx: Oropharynx is clear. No oropharyngeal exudate or posterior oropharyngeal erythema.   Eyes:      General: No scleral icterus.     Extraocular Movements: Extraocular movements intact.      Pupils: Pupils are equal, round, and reactive to light.   Neck:      Musculoskeletal: Normal range of motion. No neck rigidity.   Cardiovascular:      Rate and Rhythm: Normal rate and regular rhythm.      Pulses: Normal pulses.      Heart sounds: Normal heart sounds. No murmur. No friction rub. No gallop.    Pulmonary:      Effort: Pulmonary effort is normal. No tachypnea, accessory muscle usage or respiratory distress.      Breath sounds: Normal breath sounds. No stridor. No decreased breath sounds, wheezing, rhonchi or rales.   Chest:      Chest wall: No mass, deformity, tenderness or crepitus.   Abdominal:      General: Abdomen is flat. Bowel sounds are normal.      Palpations: Abdomen is soft. There is no hepatomegaly, splenomegaly or mass.      Tenderness: There is no abdominal tenderness. There is no guarding or rebound.      Hernia: No hernia is present.   Skin:     General: Skin is warm.      Capillary Refill: Capillary refill takes less than 2 seconds.      Findings: No rash.   Neurological:      Mental Status: He is alert and oriented to person, place, and time.      GCS: GCS eye subscore is 4. GCS verbal subscore is 5. GCS motor subscore is 6.      Cranial Nerves: Cranial nerves are intact.      Sensory: Sensation is intact.      Comments: Normal and equal sensation strength  "throughout moving all extremities freely.   Psychiatric:         Mood and Affect: Mood normal.         Behavior: Behavior normal.         Procedures           ED Course    /73   Pulse 66   Temp 97.9 °F (36.6 °C) (Oral)   Resp 20   Ht 177.8 cm (70\")   Wt 102 kg (225 lb 5 oz)   SpO2 99%   BMI 32.33 kg/m²   Medications   sodium chloride 0.9 % flush 10 mL (has no administration in time range)   diphenhydrAMINE (BENADRYL) injection 25 mg (25 mg Intravenous Given 2/2/21 1703)   prochlorperazine (COMPAZINE) injection 10 mg (10 mg Intravenous Given 2/2/21 1707)     Labs Reviewed   COMPREHENSIVE METABOLIC PANEL - Abnormal; Notable for the following components:       Result Value    ALT (SGPT) 42 (*)     All other components within normal limits    Narrative:     GFR Normal >60  Chronic Kidney Disease <60  Kidney Failure <15     CBC WITH AUTO DIFFERENTIAL - Abnormal; Notable for the following components:    RBC 4.08 (*)     MCH 33.5 (*)     All other components within normal limits   CARBON MONOXIDE, BLOOD - Normal   BNP (IN-HOUSE) - Normal    Narrative:     Among patients with dyspnea, NT-proBNP is highly sensitive for the detection of acute congestive heart failure. In addition NT-proBNP of <300 pg/ml effectively rules out acute congestive heart failure with 99% negative predictive value.    Results may be falsely decreased if patient taking Biotin.     TROPONIN (IN-HOUSE) - Normal    Narrative:     Troponin T Reference Range:  <= 0.03 ng/mL-   Negative for AMI  >0.03 ng/mL-     Abnormal for myocardial necrosis.  Clinicians would have to utilize clinical acumen, EKG, Troponin and serial changes to determine if it is an Acute Myocardial Infarction or myocardial injury due to an underlying chronic condition.       Results may be falsely decreased if patient taking Biotin.     SEDIMENTATION RATE - Normal   CBC AND DIFFERENTIAL    Narrative:     The following orders were created for panel order CBC & " Differential.  Procedure                               Abnormality         Status                     ---------                               -----------         ------                     CBC Auto Differential[[id number]]        Abnormal            Final result                 Please view results for these tests on the individual orders.     Mri Brain Without Contrast    Result Date: 2/2/2021  No acute intracranial abnormality.  Electronically Signed By-Bebeto Bolanos MD On:2/2/2021 6:36 PM This report was finalized on [id number] by  Bebeto Bolanos MD.    Xr Chest 1 View    Result Date: 2/2/2021  There is no significant change when compared to the prior study. There is no evidence for acute cardiopulmonary process.  Electronically Signed By-Shay Ingram MD On:2/2/2021 3:37 PM This report was finalized on [id number] by  Shay Ingram MD.                                           MDM  Number of Diagnoses or Management Options  Intractable headache, unspecified chronicity pattern, unspecified headache type:   Diagnosis management comments: Chart Review:  Hospitalization from 7/14/2020 to 7/15/2020 for neutropenia dyspnea and chest pain stress test with myocardial perfusion on 7/15/2020 showed findings consistent with normal EKG stress test ejection fraction 63% normal study    Comorbidity: As per past medical history  ECG: Interpreted by myself and  showed sinus rhythm rate 61 no ST elevation or other abnormalities previous EKG was reviewed from 7/14/2020  Labs: Troponin BNP sed rate all within normal limits.  CBC and CMP fairly markable as above.  Carbon monoxide 1.7 within normal limits  Imaging: Was interpreted by physician and reviewed by myself:  Xr Chest 1 View  Result Date: 2/2/2021  There is no significant change when compared to the prior study. There is no evidence for acute cardiopulmonary process.  Electronically Signed By-Shay Ingram MD On:2/2/2021 3:37 PM This report was finalized on  [id number] by  Shay Ingram MD.    Disposition/Treatment:  Appropriate PPE was worn during exam and throughout all encounters with the patient.  When the ED IV was placed and labs were obtained patient was in no respiratory distress speaking in full sentences airway remained patent.  Patient was given Benadryl Compazine for his headache he showed no acute cranial focal neuro deficits or meningeal signs.  EKG showed no acute abnormalities troponin and BNP all within normal limits.  Sed rate unremarkable.  There were no signs of severe infection or electrolyte abnormality on CBC and CMP.  X-ray showed no acute cardiopulmonary abnormalities.  Patient care transferred to Concha Powers NP pending MRI results and disposition.    1830 Patient care assumed by [facility]  2000 MRI reviewed and negative patient will be discharged home to follow with primary care and he verbalized understood all discharge instructions and the need to return if worse he reports no pain at this time.       Amount and/or Complexity of Data Reviewed  Clinical lab tests: reviewed  Tests in the radiology section of CPT®: reviewed  Tests in the medicine section of CPT®: reviewed    Risk of Complications, Morbidity, and/or Mortality  Presenting problems: low  Diagnostic procedures: moderate  Management options: low    Patient Progress  Patient progress: improved      Final diagnoses:   Intractable headache, unspecified chronicity pattern, unspecified headache type            Isaura Powers, APRN  02/02/21 2000

## 2021-02-04 LAB — QT INTERVAL: 403 MS

## 2021-09-02 ENCOUNTER — OFFICE VISIT (OUTPATIENT)
Dept: FAMILY MEDICINE CLINIC | Facility: CLINIC | Age: 66
End: 2021-09-02

## 2021-09-02 VITALS
DIASTOLIC BLOOD PRESSURE: 81 MMHG | BODY MASS INDEX: 32.84 KG/M2 | TEMPERATURE: 97.7 F | OXYGEN SATURATION: 99 % | HEIGHT: 70 IN | RESPIRATION RATE: 16 BRPM | HEART RATE: 56 BPM | WEIGHT: 229.4 LBS | SYSTOLIC BLOOD PRESSURE: 136 MMHG

## 2021-09-02 DIAGNOSIS — Z00.00 HEALTH MAINTENANCE EXAMINATION: Primary | ICD-10-CM

## 2021-09-02 DIAGNOSIS — Z12.5 PROSTATE CANCER SCREENING: ICD-10-CM

## 2021-09-02 DIAGNOSIS — E66.9 OBESITY (BMI 30-39.9): ICD-10-CM

## 2021-09-02 DIAGNOSIS — G47.33 OSA (OBSTRUCTIVE SLEEP APNEA): ICD-10-CM

## 2021-09-02 DIAGNOSIS — R03.0 PREHYPERTENSION: ICD-10-CM

## 2021-09-02 DIAGNOSIS — R20.2 PARESTHESIA OF LEFT LOWER EXTREMITY: ICD-10-CM

## 2021-09-02 PROCEDURE — 99396 PREV VISIT EST AGE 40-64: CPT | Performed by: NURSE PRACTITIONER

## 2021-09-02 RX ORDER — IBUPROFEN 200 MG
200 TABLET ORAL AS NEEDED
COMMUNITY
End: 2022-01-31

## 2021-09-02 NOTE — PROGRESS NOTES
"Chief Complaint  Annual Exam    Subjective          Hiram Echevarria Jr. presents to [facility] PRIMARY [facility]  Pleasant patient here today for complete physical exam he has been doing well  , He is updated with his Covid vaccines,  Prehypertension obesity BMI greater than 30  Can improve his diet with some healthy weight loss  Chronic paresthesias left lower extremity no change obstructive sleep apnea takes his CPAP but no problem      Objective   Vital Signs:   /81   Pulse 56   Temp 97.7 °F (36.5 °C) (Infrared)   Resp 16   Ht 177.8 cm (70\")   Wt 104 kg (229 lb 6.4 oz)   SpO2 99%   BMI 32.92 kg/m²     Physical Exam  Vitals reviewed.   Constitutional:       Appearance: He is well-developed.   HENT:      Head: Normocephalic.      Nose: Nose normal.   Eyes:      General: No scleral icterus.     Conjunctiva/sclera: Conjunctivae normal.      Pupils: Pupils are equal, round, and reactive to light.   Neck:      Thyroid: No thyromegaly.      Vascular: No JVD.   Cardiovascular:      Rate and Rhythm: Normal rate and regular rhythm.      Heart sounds: Normal heart sounds. No murmur heard.   No friction rub. No gallop.    Pulmonary:      Effort: Pulmonary effort is normal. No respiratory distress.      Breath sounds: Normal breath sounds. No stridor. No wheezing or rales.   Abdominal:      General: Bowel sounds are normal. There is no distension.      Palpations: Abdomen is soft.      Tenderness: There is no abdominal tenderness.      Comments: No hepatosplenomegaly, no ascites,   Genitourinary:     Testes: Normal.      Prostate: Normal.   Musculoskeletal:         General: No tenderness.      Cervical back: Neck supple.   Lymphadenopathy:      Cervical: No cervical adenopathy.   Skin:     General: Skin is warm and dry.      Findings: No erythema or rash.   Neurological:      Mental Status: He is alert and oriented to person, place, and time.      Deep Tendon Reflexes: Reflexes are normal and " symmetric.   Psychiatric:         Behavior: Behavior normal.         Thought Content: Thought content normal.         Judgment: Judgment normal.        Result Review :                 Assessment and Plan    Diagnoses and all orders for this visit:    1. Health maintenance examination (Primary)  -     CBC & Differential  -     Comprehensive Metabolic Panel  -     Lipid Panel With LDL / HDL Ratio  -     TSH Rfx On Abnormal To Free T4  -     Urinalysis With Microscopic If Indicated (No Culture) - Urine, Clean Catch  -     PSA Screen    2. DOREEN (obstructive sleep apnea)  -     Ambulatory Referral to Sleep Medicine  -     CBC & Differential  -     Comprehensive Metabolic Panel  -     Lipid Panel With LDL / HDL Ratio  -     TSH Rfx On Abnormal To Free T4  -     Urinalysis With Microscopic If Indicated (No Culture) - Urine, Clean Catch  -     PSA Screen    3. Paresthesia of left lower extremity  Comments:  Some chronic stable paresthesia left lateral from previous nerve impingement damage previous back problems  Orders:  -     CBC & Differential  -     Comprehensive Metabolic Panel  -     Lipid Panel With LDL / HDL Ratio  -     TSH Rfx On Abnormal To Free T4  -     Urinalysis With Microscopic If Indicated (No Culture) - Urine, Clean Catch  -     PSA Screen    4. Obesity (BMI 30-39.9)  -     CBC & Differential  -     Comprehensive Metabolic Panel  -     Lipid Panel With LDL / HDL Ratio  -     TSH Rfx On Abnormal To Free T4  -     Urinalysis With Microscopic If Indicated (No Culture) - Urine, Clean Catch  -     PSA Screen    5. Prehypertension  -     CBC & Differential  -     Comprehensive Metabolic Panel  -     Lipid Panel With LDL / HDL Ratio  -     TSH Rfx On Abnormal To Free T4  -     Urinalysis With Microscopic If Indicated (No Culture) - Urine, Clean Catch  -     PSA Screen    6. Prostate cancer screening  -     CBC & Differential  -     Comprehensive Metabolic Panel  -     Lipid Panel With LDL / HDL Ratio  -     TSH  Rfx On Abnormal To Free T4  -     Urinalysis With Microscopic If Indicated (No Culture) - Urine, Clean Catch  -     PSA Screen    Other orders  -     Microscopic Examination -        Follow Up   Return for Annual physical.  Patient was given instructions and counseling regarding his condition or for health maintenance advice. Please see specific information pulled into the AVS if appropriate.     Discharge instructions continue healthy diet regular exercise consider or continue intermittent fasting vegetables vegetables vegetables lots of vegetables lots of fiber need to fiber to decrease the absorption rate of carbohydrates which drive appetite and promote fatty liver disease and cirrhosis  Gradual weight loss over the next 12 months    Continue present care  Check blood pressure once a month  With additional gradual weight loss your blood pressure improve your presently prehypertensive    Covid booster when available  Should you get Covid push fluids treat fever ibuprofen 800    3 times a day any shortness of breath urgent care shortness of breath at rest or difficulty speaking high fever lethargy emergency room as well as schedule appointment ASAP as soon as you are diagnosed to see if you want to get Covid antibodies

## 2021-09-02 NOTE — PATIENT INSTRUCTIONS
Discharge instructions continue healthy diet regular exercise consider or continue intermittent fasting vegetables vegetables vegetables lots of vegetables lots of fiber need to fiber to decrease the absorption rate of carbohydrates which drive appetite and promote fatty liver disease and cirrhosis  Gradual weight loss over the next 12 months    Continue present care  Check blood pressure once a month  With additional gradual weight loss your blood pressure improve your presently prehypertensive    Covid booster when available  Should you get Covid push fluids treat fever ibuprofen 800    3 times a day any shortness of breath urgent care shortness of breath at rest or difficulty speaking high fever lethargy emergency room as well as schedule appointment ASAP as soon as you are diagnosed to see if you want to get Covid antibodies

## 2021-09-03 LAB
ALBUMIN SERPL-MCNC: 4.8 G/DL (ref 3.5–5.2)
ALBUMIN/GLOB SERPL: 2.1 G/DL
ALP SERPL-CCNC: 84 U/L (ref 39–117)
ALT SERPL-CCNC: 28 U/L (ref 1–41)
APPEARANCE UR: CLEAR
AST SERPL-CCNC: 33 U/L (ref 1–40)
BACTERIA #/AREA URNS HPF: ABNORMAL /HPF
BASOPHILS # BLD AUTO: 0.04 10*3/MM3 (ref 0–0.2)
BASOPHILS NFR BLD AUTO: 0.7 % (ref 0–1.5)
BILIRUB SERPL-MCNC: 2 MG/DL (ref 0–1.2)
BILIRUB UR QL STRIP: NEGATIVE
BUN SERPL-MCNC: 16 MG/DL (ref 8–23)
BUN/CREAT SERPL: 14.2 (ref 7–25)
CALCIUM SERPL-MCNC: 9.8 MG/DL (ref 8.6–10.5)
CASTS URNS MICRO: ABNORMAL
CHLORIDE SERPL-SCNC: 98 MMOL/L (ref 98–107)
CHOLEST SERPL-MCNC: 188 MG/DL (ref 0–200)
CO2 SERPL-SCNC: 26.7 MMOL/L (ref 22–29)
COLOR UR: YELLOW
CREAT SERPL-MCNC: 1.13 MG/DL (ref 0.76–1.27)
EOSINOPHIL # BLD AUTO: 0.1 10*3/MM3 (ref 0–0.4)
EOSINOPHIL NFR BLD AUTO: 1.8 % (ref 0.3–6.2)
EPI CELLS #/AREA URNS HPF: ABNORMAL /HPF
ERYTHROCYTE [DISTWIDTH] IN BLOOD BY AUTOMATED COUNT: 12.6 % (ref 12.3–15.4)
GLOBULIN SER CALC-MCNC: 2.3 GM/DL
GLUCOSE SERPL-MCNC: 90 MG/DL (ref 65–99)
GLUCOSE UR QL: NEGATIVE
HCT VFR BLD AUTO: 43.6 % (ref 37.5–51)
HDLC SERPL-MCNC: 48 MG/DL (ref 40–60)
HGB BLD-MCNC: 14.4 G/DL (ref 13–17.7)
HGB UR QL STRIP: ABNORMAL
IMM GRANULOCYTES # BLD AUTO: 0.02 10*3/MM3 (ref 0–0.05)
IMM GRANULOCYTES NFR BLD AUTO: 0.4 % (ref 0–0.5)
KETONES UR QL STRIP: NEGATIVE
LDLC SERPL CALC-MCNC: 126 MG/DL (ref 0–100)
LDLC/HDLC SERPL: 2.6 {RATIO}
LEUKOCYTE ESTERASE UR QL STRIP: NEGATIVE
LYMPHOCYTES # BLD AUTO: 1.65 10*3/MM3 (ref 0.7–3.1)
LYMPHOCYTES NFR BLD AUTO: 29.2 % (ref 19.6–45.3)
MCH RBC QN AUTO: 32.2 PG (ref 26.6–33)
MCHC RBC AUTO-ENTMCNC: 33 G/DL (ref 31.5–35.7)
MCV RBC AUTO: 97.5 FL (ref 79–97)
MONOCYTES # BLD AUTO: 0.56 10*3/MM3 (ref 0.1–0.9)
MONOCYTES NFR BLD AUTO: 9.9 % (ref 5–12)
NEUTROPHILS # BLD AUTO: 3.28 10*3/MM3 (ref 1.7–7)
NEUTROPHILS NFR BLD AUTO: 58 % (ref 42.7–76)
NITRITE UR QL STRIP: NEGATIVE
NRBC BLD AUTO-RTO: 0 /100 WBC (ref 0–0.2)
PH UR STRIP: 6.5 [PH] (ref 5–8)
PLATELET # BLD AUTO: 245 10*3/MM3 (ref 140–450)
POTASSIUM SERPL-SCNC: 4.8 MMOL/L (ref 3.5–5.2)
PROT SERPL-MCNC: 7.1 G/DL (ref 6–8.5)
PROT UR QL STRIP: NEGATIVE
PSA SERPL-MCNC: 0.48 NG/ML (ref 0–4)
RBC # BLD AUTO: 4.47 10*6/MM3 (ref 4.14–5.8)
RBC #/AREA URNS HPF: ABNORMAL /HPF
SODIUM SERPL-SCNC: 135 MMOL/L (ref 136–145)
SP GR UR: 1.01 (ref 1–1.03)
TRIGL SERPL-MCNC: 75 MG/DL (ref 0–150)
TSH SERPL DL<=0.005 MIU/L-ACNC: 1.33 UIU/ML (ref 0.27–4.2)
UROBILINOGEN UR STRIP-MCNC: ABNORMAL MG/DL
VLDLC SERPL CALC-MCNC: 14 MG/DL (ref 5–40)
WBC # BLD AUTO: 5.65 10*3/MM3 (ref 3.4–10.8)
WBC #/AREA URNS HPF: ABNORMAL /HPF

## 2021-10-07 ENCOUNTER — OFFICE VISIT (OUTPATIENT)
Dept: SLEEP MEDICINE | Facility: HOSPITAL | Age: 66
End: 2021-10-07

## 2021-10-07 VITALS
HEIGHT: 70 IN | SYSTOLIC BLOOD PRESSURE: 144 MMHG | BODY MASS INDEX: 33.21 KG/M2 | OXYGEN SATURATION: 99 % | WEIGHT: 232 LBS | DIASTOLIC BLOOD PRESSURE: 78 MMHG

## 2021-10-07 DIAGNOSIS — G47.33 OSA (OBSTRUCTIVE SLEEP APNEA): Primary | ICD-10-CM

## 2021-10-07 DIAGNOSIS — E66.9 CLASS 1 OBESITY: ICD-10-CM

## 2021-10-07 PROBLEM — E66.811 CLASS 1 OBESITY: Status: ACTIVE | Noted: 2021-09-02

## 2021-10-07 PROCEDURE — 99204 OFFICE O/P NEW MOD 45 MIN: CPT | Performed by: INTERNAL MEDICINE

## 2021-10-07 PROCEDURE — G0463 HOSPITAL OUTPT CLINIC VISIT: HCPCS

## 2021-10-07 NOTE — PROGRESS NOTES
[facility]  [address] [facility]  3rd [place] [address]  Phone   Fax       Hiram Echevarria Jr.  1955  65 y.o.  male      Referring physician/provider and PCP Epley, James, APRN    Type of service: Initial Sleep Medicine Consult.  Date of service: 10/7/2021      Chief Complaint   Patient presents with   • Sleep Apnea   • Obesity       History of present illness;  Thank you for asking to see Hiram Echevarria Jr., 65 y.o.. The patient was seen today on 10/7/2021 at [facility].  Patient gives history of having sleep sleep apnea was tested at [facility].  I have reviewed his sleep study which was conducted on the July 9, 2016 it was a split-night study.  The study showed severe sleep apnea with AHI of 60/h and he was given a BiPAP of 13/9 cm.  Unfortunately his BiPAP as stopped working I am unable to get any download.  He is due for a replacement.  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    Patient gives the following sleep history.  Sleep schedule:  Bedtime: 10 PM  Wake time: 5 AM  Normally takes about 5-10 minutes to fall asleep  Average hours of sleep 6-7  Number of naps per day none      Unable to get the download from the BiPAP    MEDICAL CONDITIONS (PMH)  1. 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 No history of sleep apnea  2. Diabetes mellitus  3. Hypertension    Medications: reviewed    Review of systems:  Sleep: Positve for snoring,witnessed apnea and daytime excessive sleepiness with B[place] Sleepiness Scale of Total score: 6   Kidney/ Bladder  Difficulty In Urination: negative  Bed Wetting: negative  Frequent Urination:  "negative  HEENT  Recurrent Nose Bleeds: negative  Ear pain: negative  Sores In Mouth: negative  Persistent Hoarseness: negative  Nasal Congestion: negative  Post Nasal Drip: negative  Musculoskeletal:  Neck Pain: negative  Temporomandibular Joint Pain: negative  General:  Fever: negative  Fatigue: positive  Cardiovascular:  Irregular Heartbeat: negative  Swollen Ankles Or Legs: negative  Respiratory:  Shortness Of Breath: negative  Wheezing: negative  Neuro/Paych:  Fainting Spells: negative  Dizziness: negative  Anxiety: negative  Depression: negative  Gastrointestinal:  Problem Swallowing: negative  Frequent Heartburn: negative  Abdominal Bloating: negative  Skin:  Rash: negative  Change In Nails: negative  Endocrine:  Excessive Thirst: negative  Always Too Cold: negative  Always Too Warm: negative  Hem/Lymphatic:  Swollen Glands: negative  Burses/ Bleeds Easily: negative    Physical exam:  CONSTITUTINONAL:  Vitals:    10/07/21 1526   BP: 144/78   SpO2: 99%   Weight: 105 kg (232 lb)   Height: 177.8 cm (70\")    Body mass index is 33.29 kg/m².   HEAD: atraumatic, normocephalic   EYES:pupils are round, equal and reacting to light and accommodation, conjunctiva normal  NOSE:no nasal septal defects, nasal passages are clear, no nasal polyps,   THROAT: tonsils are not present, tongue normal size, oral airway Mallampati class 3  NECK: , trachea is in the midline, thyroid not enlarged  RESPIRATORY SYSTEM: Breath sounds are normal, there are no wheezes  CARDIOVASULAR SYSTEM: Heart sounds are regular rhythm and normal rate, there are no murmurs or thrills, no edema  GASTROINTESTINAL: Soft and non-tender,liver not enlarged, no evidence of ascites  MUSCULOSKELETAL SYSTEM: Full range of motion's in all the 4 extremities, neck movement not restricted, temporomandibular joint movement normal and no tenderness  SKIN: Warm and moist, no cyanosis, no clubbing,  NEUROLOGICAL SYSTEM: Oriented x 3, no gross neurological defects, No " speech defect, gait is normal  PSYCHIATRIC SYSTEM: Mood is normal, thought content is normal    Labs reviewed.  TSH Results:  TSH    TSH 9/2/21   TSH 1.330                Assessment and plan:  · Obstructive sleep apnea.  I have reviewed his sleep study which shows appears obstructive sleep apnea and is on a BiPAP.  He needs a replacement BiPAP because his BiPAP is not working and unable to get any download.  He is BiPAP is more than 5 years old and needs a replacement.  I am going to send a prescription to [facility] to get a new BiPAP set at 13/9.  Patient will see me back in about 31 to 90 days for compliance after getting the BiPAP. I also discussed the complications of untreated sleep apnea including effects on hypertension, diabetes mellitus and nonrestorative sleep with hypersomnia which can increase risk for motor vehicle accidents.  Untreated sleep apnea is also a risk factor for development of atrial fibrillation, pulmonary hypertension and stroke.   · Snoring (R06.83), snoring is the sound created by turbulent airflow vibrating upper airway soft tissue due to limitation of inspiratory airflow. I have also discussed factors affecting snoring including sleep deprivation, sleeping on the back and alcohol ingestion. To minimize snoring, patient is advised to have adequate sleep, sleep on the side and avoid alcohol and sedative medications before bedtime.  Due to untreated sleep apnea  · Daytime excessive sleepiness .  It was assessed with Hopkins Sleepiness Scale of Total score: 6.  There are many causes for daytime excessive sleepiness including sleep depression, shiftwork syndrome, depression and other medical disorders including heart, kidney and liver failure.  The most serious cause of excessive sleepiness is due to neurological conditions like narcolepsy/cataplexy.  But the most common cause of excessive sleepiness is due to sleep apnea with frequent awakenings during sleep time.  I have discussed safety  of driving and to remain vigilant while driving.  · Obesity class 1, patient's BMI is Body mass index is 33.29 kg/m².. I have discussed the relationship between weight and sleep apnea.There is direct correlation between weight and severity of sleep apnea.  Weight reduction is encouraged, as it is going to reduce the severity of sleep apnea. I have also discussed with the patient diet and exercise to achieve ideal body weight.  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    I have also discussed with the patient the following  • Sleep hygiene: Maintaining a regular bedtime and wake time, not to watch television or work in bed, limit caffeine-containing beverages before bed time and avoid naps during the day  • Adequate amount of sleep.  Generally most people needs about 7 to 8 hours of sleep.    Return for 31 to 90 days after PAP setup with down load..  Patient's questions were answered      I once again thank you for asking me to see this patient in consultation and I have forwarded my opinion and treatment plan.  Please do not hesitate to call me if you have any questions.     Inga King MD  Sleep Medicine  Medical Director, [facility]  10/7/2021 ,

## 2021-11-03 ENCOUNTER — DOCUMENTATION (OUTPATIENT)
Dept: SURGERY | Facility: CLINIC | Age: 66
End: 2021-11-03

## 2021-11-03 PROBLEM — R50.9 FEBRILE ILLNESS: Status: ACTIVE | Noted: 2020-07-15

## 2021-11-03 NOTE — PROGRESS NOTES
3 YR CY, last done 12/06/2018.     11/04/2021, colonoscopy recall letter mailed to patient.     Thank you.

## 2021-11-19 ENCOUNTER — PREP FOR SURGERY (OUTPATIENT)
Dept: OTHER | Facility: HOSPITAL | Age: 66
End: 2021-11-19

## 2021-11-19 DIAGNOSIS — Z86.010 HISTORY OF COLON POLYPS: Primary | ICD-10-CM

## 2022-01-13 ENCOUNTER — APPOINTMENT (OUTPATIENT)
Dept: SLEEP MEDICINE | Facility: HOSPITAL | Age: 67
End: 2022-01-13

## 2022-01-29 ENCOUNTER — TELEMEDICINE (OUTPATIENT)
Dept: FAMILY MEDICINE CLINIC | Facility: TELEHEALTH | Age: 67
End: 2022-01-29

## 2022-01-29 DIAGNOSIS — R05.8 COUGH WITH EXPOSURE TO COVID-19 VIRUS: Primary | ICD-10-CM

## 2022-01-29 DIAGNOSIS — Z20.822 COUGH WITH EXPOSURE TO COVID-19 VIRUS: Primary | ICD-10-CM

## 2022-01-29 PROCEDURE — U0004 COV-19 TEST NON-CDC HGH THRU: HCPCS | Performed by: NURSE PRACTITIONER

## 2022-01-29 PROCEDURE — 99213 OFFICE O/P EST LOW 20 MIN: CPT | Performed by: NURSE PRACTITIONER

## 2022-01-29 NOTE — PATIENT INSTRUCTIONS
Notify GI of Covid exposure regardless of your test results on Monday.  Order has been placed for SARS-CoV-2 (Coronavirus) test to be done at your nearest [facility]. You don't need to call the Urgent Care, just let them know when you arrive that you have been assessed by a Virtual Care Provider and that you have an order for testing. We will call with results when they are available. The results will be released to your [employer] faviola. A [employer] message will also be sent after the first attempted call to notify you that your test results are available. Continue to treat your symptoms just as you would for any viral illness. Take Tylenol and/or Ibuprofen for pain and/or fever, you may find it better to alternate these every 4 hours, so that you are only taking each every 8 hours. Stay hydrated, rest and you may treat cough with over-the-counter cough syrup such as Robitussin. You will need to Self Quarantine.  Covid Exposure without Symptoms for NonHealthcare Workers  The date of your last exposure is Day 0.  Symptoms may appear 2-14 days after someone is exposed to the virus. Wear a well-fitting mask around others for 10 days from the date of your last exposure. Get tested at least 5 days after you last had close contact with the infected person. You do not need to stay at home if you do NOT have symptoms, but you have received the primary series of recommended vaccine and booster, if eligible. If you are NOT vaccinated, it is recommended that you stay at home for at least 5 days from your last contact with the infected person and continue to wear a mask around others for an additional 5 days. Watch for fever of 100.4 or greater, cough, shortness of breath, loss of taste/smell, sore throat, nasal congestion or other Covid symptoms. If you develop symptoms, get tested immediately and stay home.     Covid Symptoms After Exposure  Anyone exposed to Covid who develops symptoms should immediately quarantine until  "a negative test confirms symptoms are not attributable to Covid-19. If you are vaccinated and your test is negative, it is recommended that you stay in isolation at least 5 days from symptom onset, unless you are a healthcare worker. If you are a healthcare worker, check with your employer on their policy, as this may vary.    Upper Respiratory Infections  Management is centered around symptom relief. Symptom relief consists of drinking plenty of NON-Caffeinated fluids to thin secretions, cool mist humidifier to sooth sinus congestion and inflammation, warm compresses to face for sinus pain and pressure relief, Nasal rinses to clear mucous. Over the Counter medications can help with symptoms as follows:    NSAIDs (Non-Steroidal Anti-Inflammatory Drugs) such as Ibuprofen are more beneficial for pain and inflammation. (May not be appropriate if you have High blood Pressure, Ulcers or Hx of GI bleeding).  Mucinex can thin the mucous and secretions to help clear the \"snot\" so that you can breath easier. Blow your nose often, use nasal rinse to clear as needed.  Decongestants- oral such as Sudafed (if no heart disease or Hypertension) or nasal such as Afrin for 2-3 days only to help breath through your nose easier and relieve nasal congestion.  Antihistamine-use if seasonal allergies are a problem and/or you feel like you have drainage that could be traveling down the back of the throat and causing cough and/or sore throat.  Steroid nasal spray such as Flonase-help to decrease the inflammation in your nasal and sinus passages and decrease the mucous and nasal congestion.  "

## 2022-01-29 NOTE — PROGRESS NOTES
Mode of Visit: Video  Location of patient: home  You have chosen to receive care through a telehealth visit.  The patient has signed the video visit consent form.  The visit included audio and video interaction. No technical issues occurred during this visit.     Chief Complaint  Exposure To Known Illness (wife and dtr tested positive in the last few days), Cough (onset was yesterday, he was scheduled for colonoscopy next week and concerned about his covid status.), Sore Throat, and Headache    Subjective          Hiram Echevarria  presents to [facility]  Exposed to Covid in his household and now with symptoms that started yesterday. He has cough, sore throat, headache and runny nose but no fever, chills, SOA or wheezing. He is scheduled for a colonoscopy on Thursday and wanted to get tested for Covid.    Cough  This is a new problem. The current episode started yesterday. The cough is non-productive. Associated symptoms include headaches and a sore throat. Pertinent negatives include no fever or shortness of breath.   Sore Throat   This is a new problem. The current episode started yesterday. There has been no fever. Associated symptoms include coughing and headaches. Pertinent negatives include no shortness of breath, swollen glands or trouble swallowing.   Headache   This is a new problem. The current episode started yesterday. Associated symptoms include coughing and a sore throat. Pertinent negatives include no fever or swollen glands.     Objective   Vital Signs:   There were no vitals taken for this visit.    Physical Exam   Constitutional: He appears well-developed and well-nourished. He does not appear ill. No distress.   Pulmonary/Chest: Effort normal.  No respiratory distress.  Neurological: He is alert.   Psychiatric: He has a normal mood and affect.     Result Review :                 Assessment and Plan    Diagnoses and all orders for this visit:    1. Cough with  exposure to COVID-19 virus (Primary)  -     COVID-19,APTIMA PANTHER(LOUISE),[facility], NP/OP SWAB IN UTM/VTM/[facility] [employer],24 HR TAT - Swab, Nasal Cavity; Future  -     QUESTIONNAIRE SERIES    Notify GI of Covid exposure regardless of your test results on Monday.    Covid Exposure without Symptoms for NonHealthcare Workers  The date of your last exposure is Day 0.  Symptoms may appear 2-14 days after someone is exposed to the virus. Wear a well-fitting mask around others for 10 days from the date of your last exposure. Get tested at least 5 days after you last had close contact with the infected person. You do not need to stay at home if you do NOT have symptoms, but you have received the primary series of recommended vaccine and booster, if eligible. If you are NOT vaccinated, it is recommended that you stay at home for at least 5 days from your last contact with the infected person and continue to wear a mask around others for an additional 5 days. Watch for fever of 100.4 or greater, cough, shortness of breath, loss of taste/smell, sore throat, nasal congestion or other Covid symptoms. If you develop symptoms, get tested immediately and stay home.     Covid Symptoms After Exposure  Anyone exposed to Covid who develops symptoms should immediately quarantine until a negative test confirms symptoms are not attributable to Covid-19. If you are vaccinated and your test is negative, it is recommended that you stay in isolation at least 5 days from symptom onset, unless you are a healthcare worker. If you are a healthcare worker, check with your employer on their policy, as this may vary.    Upper Respiratory Infections  Management is centered around symptom relief. Symptom relief consists of drinking plenty of NON-Caffeinated fluids to thin secretions, cool mist humidifier to sooth sinus congestion and inflammation, warm compresses to face for sinus pain and pressure relief, Nasal rinses to clear mucous. Over  "the Counter medications can help with symptoms as follows:    NSAIDs (Non-Steroidal Anti-Inflammatory Drugs) such as Ibuprofen are more beneficial for pain and inflammation. (May not be appropriate if you have High blood Pressure, Ulcers or Hx of GI bleeding).  Mucinex can thin the mucous and secretions to help clear the \"snot\" so that you can breath easier. Blow your nose often, use nasal rinse to clear as needed.  Decongestants- oral such as Sudafed (if no heart disease or Hypertension) or nasal such as Afrin for 2-3 days only to help breath through your nose easier and relieve nasal congestion.  Antihistamine-use if seasonal allergies are a problem and/or you feel like you have drainage that could be traveling down the back of the throat and causing cough and/or sore throat.  Steroid nasal spray such as Flonase-help to decrease the inflammation in your nasal and sinus passages and decrease the mucous and nasal congestion.         I spent 20 minutes caring for Hiram on this date of service. This time includes time spent by me in the following activities:preparing for the visit, obtaining and/or reviewing a separately obtained history, performing a medically appropriate examination and/or evaluation , counseling and educating the patient/family/caregiver, ordering medications, tests, or procedures, and documenting information in the medical record  Follow Up {Instructions Charge Capture  Follow-up Communications :23}  Return if symptoms worsen or fail to improve.  Patient was given instructions and counseling regarding his condition or for health maintenance advice. Please see specific information pulled into the AVS if appropriate.       "

## 2022-03-23 ENCOUNTER — ANESTHESIA (OUTPATIENT)
Dept: GASTROENTEROLOGY | Facility: HOSPITAL | Age: 67
End: 2022-03-23

## 2022-03-23 ENCOUNTER — HOSPITAL ENCOUNTER (OUTPATIENT)
Facility: HOSPITAL | Age: 67
Setting detail: HOSPITAL OUTPATIENT SURGERY
Discharge: HOME | End: 2022-03-23
Attending: COLON & RECTAL SURGERY | Admitting: COLON & RECTAL SURGERY

## 2022-03-23 ENCOUNTER — ANESTHESIA EVENT (OUTPATIENT)
Dept: GASTROENTEROLOGY | Facility: HOSPITAL | Age: 67
End: 2022-03-23

## 2022-03-23 VITALS
OXYGEN SATURATION: 97 % | RESPIRATION RATE: 16 BRPM | WEIGHT: 228 LBS | BODY MASS INDEX: 32.64 KG/M2 | SYSTOLIC BLOOD PRESSURE: 141 MMHG | HEART RATE: 60 BPM | HEIGHT: 70 IN | DIASTOLIC BLOOD PRESSURE: 87 MMHG

## 2022-03-23 DIAGNOSIS — Z86.010 HISTORY OF COLON POLYPS: ICD-10-CM

## 2022-03-23 PROCEDURE — 25010000002 PROPOFOL 10 MG/ML EMULSION: Performed by: ANESTHESIOLOGY

## 2022-03-23 PROCEDURE — 88305 TISSUE EXAM BY PATHOLOGIST: CPT | Performed by: COLON & RECTAL SURGERY

## 2022-03-23 RX ORDER — LIDOCAINE HYDROCHLORIDE 20 MG/ML
INJECTION, SOLUTION INFILTRATION; PERINEURAL AS NEEDED
Status: DISCONTINUED | OUTPATIENT
Start: 2022-03-23 | End: 2022-03-23 | Stop reason: SURG

## 2022-03-23 RX ORDER — PROPOFOL 10 MG/ML
VIAL (ML) INTRAVENOUS AS NEEDED
Status: DISCONTINUED | OUTPATIENT
Start: 2022-03-23 | End: 2022-03-23 | Stop reason: SURG

## 2022-03-23 RX ORDER — SODIUM CHLORIDE, SODIUM LACTATE, POTASSIUM CHLORIDE, CALCIUM CHLORIDE 600; 310; 30; 20 MG/100ML; MG/100ML; MG/100ML; MG/100ML
30 INJECTION, SOLUTION INTRAVENOUS CONTINUOUS PRN
Status: DISCONTINUED | OUTPATIENT
Start: 2022-03-23 | End: 2022-03-23 | Stop reason: HOSPADM

## 2022-03-23 RX ORDER — PROPOFOL 10 MG/ML
VIAL (ML) INTRAVENOUS CONTINUOUS PRN
Status: DISCONTINUED | OUTPATIENT
Start: 2022-03-23 | End: 2022-03-23 | Stop reason: SURG

## 2022-03-23 RX ADMIN — LIDOCAINE HYDROCHLORIDE 80 MG: 20 INJECTION, SOLUTION INFILTRATION; PERINEURAL at 08:10

## 2022-03-23 RX ADMIN — Medication 200 MCG/KG/MIN: at 08:10

## 2022-03-23 RX ADMIN — SODIUM CHLORIDE, POTASSIUM CHLORIDE, SODIUM LACTATE AND CALCIUM CHLORIDE 30 ML/HR: 600; 310; 30; 20 INJECTION, SOLUTION INTRAVENOUS at 07:46

## 2022-03-23 RX ADMIN — PROPOFOL 100 MG: 10 INJECTION, EMULSION INTRAVENOUS at 08:09

## 2022-03-23 NOTE — ANESTHESIA POSTPROCEDURE EVALUATION
"Patient: Hiram Echevarria Jr.    Procedure Summary     Date: 03/23/22 Room / Location: [facility] ENDOSCOPY 9 / [facility] ENDOSCOPY    Anesthesia Start: 0806 Anesthesia Stop: 0830    Procedure: COLONOSCOPY into cecum with polypectomy (N/A ) Diagnosis:       History of colon polyps      (History of colon polyps [Z86.010])    Surgeons: Sandra King MD Provider: Shay Reeves MD    Anesthesia Type: MAC ASA Status: 2          Anesthesia Type: MAC    Vitals  Vitals Value Taken Time   /92 03/23/22 0838   Temp     Pulse 62 03/23/22 0838   Resp 16 03/23/22 0838   SpO2 95 % 03/23/22 0838           Post Anesthesia Care and Evaluation    Patient location during evaluation: bedside  Patient participation: complete - patient participated  Level of consciousness: awake and alert  Pain management: adequate  Airway patency: patent  Anesthetic complications: No anesthetic complications    Cardiovascular status: acceptable  Respiratory status: acceptable  Hydration status: acceptable    Comments: /92 (BP Location: Left arm, Patient Position: Lying)   Pulse 62   Resp 16   Ht 177.8 cm (70\")   Wt 103 kg (228 lb)   SpO2 95%   BMI 32.71 kg/m²       "

## 2022-03-23 NOTE — DISCHARGE INSTRUCTIONS
For the next 24 hours patient needs to be with a responsible adult.    For 24 hours DO NOT drive, operate machinery, appliances, drink alcohol, make important decisions or sign legal documents.    Start with a light or bland diet and advance to regular diet as tolerated.    Follow recommendations on procedure report provided by your doctor.    Call Dr King for problems [phone]    Problems may include but not limited to: large amounts of bleeding, trouble breathing, repeated vomiting, severe unrelieved pain, fever or chills.

## 2022-03-23 NOTE — H&P
Hiram Echevarria Jr. is a 66 y.o. male  who is referred by Scarlet King MD for a colonoscopy. He   has an indications: previous adenomatous polyp.     He denies any change in bowel function, melena, or hematochezia.    Past Medical History:   Diagnosis Date   • Asymptomatic microscopic hematuria 03/2018    negative  workkup   • Bruit     LEFT CAROTID ARTERY   • Febrile illness 07/15/2020   • GERD (gastroesophageal reflux disease)    • History of colon polyps    • Neutropenia (HCC)    • OA (osteoarthritis)    • DOREEN (obstructive sleep apnea)     USES CPAP   • Right cataract    • Wears glasses        Past Surgical History:   Procedure Laterality Date   • CARPAL TUNNEL RELEASE Left 1998   • CARPAL TUNNEL RELEASE Right 08/27/1999    DR. EDVIN ANDRADE AT [facility]   • COLONOSCOPY N/A 12/6/2018    (3) 5 MM SESSILE TUBULAR ADENOMA POLYPS, DIVERTICULOSIS, RESCOPE IN 3 YRS, DR. SCARLET KING AT [facility]   • COLONOSCOPY W/ POLYPECTOMY N/A 12/18/2013    5 MM ADENOMATOUS POLYP IN TRANSVERSE, DIVERTICULOSIS IN SIGMOID, RESCOPE IN 5 YRS, DR. SCARLET KING AT [facility]   • KNEE ARTHROSCOPY Right 06/21/2005    DR. TARUN JURADO AT [facility]   • KNEE MENISCAL REPAIR Left 08/13/2018    DR. SERGIO ANDREW   • LUMBAR DISC SURGERY Left 2002    L5-S1    • TONSILLECTOMY Bilateral    • TOTAL SHOULDER ARTHROPLASTY Right 09/30/2014    DR. SERGIO ANDREW AT [facility]       No medications prior to admission.       Allergies   Allergen Reactions   • Morphine Nausea And Vomiting       Family History   Problem Relation Age of Onset   • Hypertension Mother    • Diabetes Mother    • Hypertension Father    • Diabetes Father    • Stroke Father    • Heart disease Father    • Colon cancer Neg Hx    • Prostate cancer Neg Hx    • Malig Hyperthermia Neg Hx        Social History     Socioeconomic History   • Marital status:    Tobacco Use   • Smoking status: Never Smoker   • Smokeless tobacco: Former User     Types: Chew     Quit date: 6/15/2011   Substance and Sexual  Activity   • Alcohol use: Yes     Alcohol/week: 4.0 standard drinks     Types: 4 Shots of liquor per week   • Drug use: No   • Sexual activity: Defer       Review of Systems   Gastrointestinal: Negative for abdominal pain, nausea and vomiting.   All other systems reviewed and are negative.      Vitals:    03/23/22 0740   BP: 137/75   Pulse: 64   Resp: 12   SpO2: 97%         Physical Exam  Constitutional:       Appearance: He is well-developed.   HENT:      Head: Normocephalic and atraumatic.   Eyes:      Pupils: Pupils are equal, round, and reactive to light.   Cardiovascular:      Rate and Rhythm: Regular rhythm.   Pulmonary:      Effort: Pulmonary effort is normal.   Abdominal:      General: There is no distension.      Palpations: Abdomen is soft.   Musculoskeletal:         General: Normal range of motion.   Skin:     General: Skin is warm and dry.   Neurological:      Mental Status: He is alert and oriented to person, place, and time.   Psychiatric:         Thought Content: Thought content normal.         Judgment: Judgment normal.           Assessment/Plan      indications: previous adenomatous polyp         I recommend colonoscopy.  I described risks, benefits of the procedure with the patient including but not limited to bleeding, infection, possibility of perforation and possible polypectomy. All of the patient's questions were answered and they would like to proceed with the above recommendations.

## 2022-03-23 NOTE — ANESTHESIA PREPROCEDURE EVALUATION
Anesthesia Evaluation     Patient summary reviewed and Nursing notes reviewed   no history of anesthetic complications:  NPO Solid Status: > 8 hours  NPO Liquid Status: > 4 hours           Airway   Mallampati: II  Dental      Pulmonary - normal exam   (+) sleep apnea,   Cardiovascular - negative cardio ROS and normal exam        Neuro/Psych- negative ROS  GI/Hepatic/Renal/Endo    (+)  GERD,      Musculoskeletal     Abdominal    Substance History      OB/GYN          Other   arthritis,                      Anesthesia Plan    ASA 2     MAC     intravenous induction     Anesthetic plan, all risks, benefits, and alternatives have been provided, discussed and informed consent has been obtained with: patient.        CODE STATUS:

## 2022-03-24 LAB
LAB AP CASE REPORT: NORMAL
LAB AP CLINICAL INFORMATION: NORMAL
PATH REPORT.FINAL DX SPEC: NORMAL
PATH REPORT.GROSS SPEC: NORMAL

## 2023-05-03 ENCOUNTER — OFFICE VISIT (OUTPATIENT)
Dept: FAMILY MEDICINE CLINIC | Facility: CLINIC | Age: 68
End: 2023-05-03
Payer: COMMERCIAL

## 2023-05-03 VITALS
HEIGHT: 70 IN | BODY MASS INDEX: 33.43 KG/M2 | WEIGHT: 233.5 LBS | TEMPERATURE: 97.1 F | OXYGEN SATURATION: 99 % | DIASTOLIC BLOOD PRESSURE: 82 MMHG | SYSTOLIC BLOOD PRESSURE: 162 MMHG | HEART RATE: 65 BPM

## 2023-05-03 DIAGNOSIS — Z12.5 PROSTATE CANCER SCREENING: ICD-10-CM

## 2023-05-03 DIAGNOSIS — R73.9 HYPERGLYCEMIA: ICD-10-CM

## 2023-05-03 DIAGNOSIS — Z00.00 WELCOME TO MEDICARE PREVENTIVE VISIT: Primary | ICD-10-CM

## 2023-05-03 DIAGNOSIS — R31.21 ASYMPTOMATIC MICROSCOPIC HEMATURIA: ICD-10-CM

## 2023-05-03 DIAGNOSIS — K21.9 GASTROESOPHAGEAL REFLUX DISEASE WITHOUT ESOPHAGITIS: ICD-10-CM

## 2023-05-03 DIAGNOSIS — R03.0 PREHYPERTENSION: ICD-10-CM

## 2023-05-03 PROCEDURE — G0402 INITIAL PREVENTIVE EXAM: HCPCS | Performed by: NURSE PRACTITIONER

## 2023-05-03 NOTE — PATIENT INSTRUCTIONS
Discharge instructions  Continue healthy diet and exercise,  Continue to increase activity as tolerated to some simple walking something fun, increase your heart rate try to break a sweat daily something fun to improve your cardiovascular status,  Calories less than 2000 around 1800 consider intermittent fasting at least 64 ounces of water fluids daily vegetables vegetables vegetables but much less bread Posta sweets which drive appetite increase insulin would increase his hunger,    Consider newer generation  Weight loss medications as a possibility  If they would decrease your appetite and help you get to your metabolic goals as a additional tool which is quite effective over the next 12 to 18 months    For example if you took wegovy injection weekly and you lost  35 to 40 pounds over this duration likely  You would not develop  Hypertension diabetes,  As well as decreased risk of fatty liver improve airway  Possibly reverse sleep apnea which can increase risk of stroke and heart attack A-fib   Increase mobility feelings of wellbeing       Vascular screenings carotid aorta and lower extremity recommended  CT calcium score coronary arteries may be a viable test for you as well.  This may make the difference in whether he needed a statin despite or unrelated to cholesterol levels as well as what he needs aspirin most of the test come back positive to some degree let me know if this interest you    Recheck blood pressure tomorrow make sure is controlled if not let me know what your numbers are this week.

## 2023-05-03 NOTE — PROGRESS NOTES
The ABCs of the Annual Wellness Visit  Initial Medicare Wellness Visit    Subjective     Hiram Echevarria Jr. is a 67 y.o. male who presents for an Initial Medicare Wellness Visit.    The following portions of the patient's history were reviewed and   updated as appropriate: allergies, current medications, past family history, past medical history, past social history, past surgical history and problem list.     Compared to one year ago, the patient feels his physical   health is the same.    Compared to one year ago, the patient feels his mental   health is the same.    Recent Hospitalizations:  He was not admitted to the hospital during the last year.       Current Medical Providers:  Patient Care Team:  Epley, James, APRN as PCP - General (Family Medicine)  Shimon Barnes MD as Consulting Physician (Orthopedic Surgery)  Chuck Merrill MD as Consulting Physician (Orthopedic Surgery)    Outpatient Medications Prior to Visit   Medication Sig Dispense Refill   • Influenza Vac High-Dose Quad (Fluzone High-Dose Quadrivalent) 0.7 ML suspension prefilled syringe injection Inject 0.7 mL into the appropriate muscle as directed by prescriber. 0.7 mL 0   • Zoster Vac Recomb Adjuvanted (Shingrix) 50 MCG/0.5ML reconstituted suspension Inject 0.5 mL into the appropriate muscle as directed by prescriber. 0.5 mL 0     No facility-administered medications prior to visit.       No opioid medication identified on active medication list. I have reviewed chart for other potential  high risk medication/s and harmful drug interactions in the elderly.          Aspirin is not on active medication list.  Aspirin use is not indicated based on review of current medical condition/s. Risk of harm outweighs potential benefits.  .    Patient Active Problem List   Diagnosis   • Asymptomatic microscopic hematuria   • Bruit of left carotid artery   • Benign colon polyp   • History of colon polyps   • Tinnitus of both ears   • Health  "maintenance examination   • DOREEN (obstructive sleep apnea)   • Venous insufficiency   • Paresthesia of left lower extremity   • Class 1 obesity   • Prehypertension   • Bruit   • Febrile illness   • GERD (gastroesophageal reflux disease)   • Neutropenia   • OA (osteoarthritis)   • Right cataract   • Wears glasses     Advance Care Planning   Advance Care Planning     Advance Directive is not on file.  ACP discussion was held with the patient during this visit. Patient has an advance directive (not in EMR), copy requested.       Objective    Vitals:    05/03/23 1340 05/03/23 1354   BP: 155/91 162/82   BP Location: Left arm Right arm   Patient Position: Sitting Sitting   Cuff Size: Adult Adult   Pulse: 65    Temp: 97.1 °F (36.2 °C)    SpO2: 99%    Weight: 106 kg (233 lb 8 oz)    Height: 177.8 cm (70\")    PainSc:   1    PainLoc: Shoulder      Estimated body mass index is 33.5 kg/m² as calculated from the following:    Height as of this encounter: 177.8 cm (70\").    Weight as of this encounter: 106 kg (233 lb 8 oz).    BMI is >= 30 and <35. (Class 1 Obesity). The following options were offered after discussion;: exercise counseling/recommendations, nutrition counseling/recommendations and pharmacological intervention options      Does the patient have evidence of cognitive impairment?   No  Physical exam alert and oriented not distress  ENT normal carotids clear thyroid normal chest clear heart regular rate and rhythm without murmur abdomen soft no hepatosplenomegaly masses or hernias some abdominal obesity no bruits or thrills no ascites lower extremity without edema overall patient appears well and pleasant  Psych normal        HEALTH RISK ASSESSMENT    Smoking Status:  Social History     Tobacco Use   Smoking Status Never   Smokeless Tobacco Former   • Types: Chew   • Quit date: 6/15/2011     Alcohol Consumption:  Social History     Substance and Sexual Activity   Alcohol Use Yes   • Alcohol/week: 4.0 standard drinks "   • Types: 4 Shots of liquor per week     Fall Risk Screen:    POOJA Fall Risk Assessment was completed, and patient is at MODERATE risk for falls. Assessment completed on:5/3/2023    Depression Screen:       5/3/2023     1:47 PM   PHQ-2/PHQ-9 Depression Screening   Little Interest or Pleasure in Doing Things 0-->not at all   Feeling Down, Depressed or Hopeless 0-->not at all   PHQ-9: Brief Depression Severity Measure Score 0       Health Habits and Functional and Cognitive Screenin/3/2023     1:47 PM   Functional & Cognitive Status   Do you have difficulty preparing food and eating? No   Do you have difficulty bathing yourself, getting dressed or grooming yourself? No   Do you have difficulty using the toilet? No   Do you have difficulty moving around from place to place? No   Do you have trouble with steps or getting out of a bed or a chair? No   Current Diet Well Balanced Diet   Dental Exam Not up to date   Eye Exam Not up to date   Exercise (times per week) 7 times per week   Current Exercises Include Walking   Do you need help using the phone?  No   Are you deaf or do you have serious difficulty hearing?  No   Do you need help with transportation? No   Do you need help shopping? No   Do you need help preparing meals?  No   Do you need help with housework?  No   Do you need help with laundry? No   Do you need help taking your medications? No   Do you need help managing money? No   Do you ever drive or ride in a car without wearing a seat belt? No   Have you felt unusual stress, anger or loneliness in the last month? No   Who do you live with? Spouse   If you need help, do you have trouble finding someone available to you? No   Do you have difficulty concentrating, remembering or making decisions? No       Age-appropriate Screening Schedule:  Refer to the list below for future screening recommendations based on patient's age, sex and/or medical conditions. Orders for these recommended tests are listed  in the plan section. The patient has been provided with a written plan.    Health Maintenance   Topic Date Due   • COVID-19 Vaccine (4 - Booster for Pfizer series) 02/05/2022   • ANNUAL PHYSICAL  09/02/2022   • TDAP/TD VACCINES (2 - Td or Tdap) 09/03/2022   • Pneumococcal Vaccine 65+ (1 - PCV) Never done   • ZOSTER VACCINE (2 of 2) 02/03/2023   • INFLUENZA VACCINE  08/01/2023   • COLORECTAL CANCER SCREENING  03/23/2025   • HEPATITIS C SCREENING  Completed          CMS Preventative Services Quick Reference  Risk Factors Identified During Encounter    Fall Risk-High or Moderate: Discussed Fall Prevention in the home    The above risks/problems have been discussed with the patient.  Pertinent information has been shared with the patient in the After Visit Summary.  An After Visit Summary and PPPS were made available to the patient.  Diagnoses and all orders for this visit:    1. Welcome to Medicare preventive visit (Primary)  -     CBC & Differential  -     Comprehensive Metabolic Panel  -     Lipid Panel With LDL / HDL Ratio  -     Urinalysis With Microscopic If Indicated (No Culture) - Urine, Clean Catch  -     PSA Screen  -     TSH Rfx On Abnormal To Free T4  -     Hemoglobin A1c    2. Prehypertension  -     CBC & Differential  -     Comprehensive Metabolic Panel  -     Lipid Panel With LDL / HDL Ratio  -     Urinalysis With Microscopic If Indicated (No Culture) - Urine, Clean Catch  -     PSA Screen  -     TSH Rfx On Abnormal To Free T4  -     Hemoglobin A1c    3. Asymptomatic microscopic hematuria  -     CBC & Differential  -     Comprehensive Metabolic Panel  -     Lipid Panel With LDL / HDL Ratio  -     Urinalysis With Microscopic If Indicated (No Culture) - Urine, Clean Catch  -     PSA Screen  -     TSH Rfx On Abnormal To Free T4  -     Hemoglobin A1c    4. Gastroesophageal reflux disease without esophagitis  -     CBC & Differential  -     Comprehensive Metabolic Panel  -     Lipid Panel With LDL / HDL  Ratio  -     Urinalysis With Microscopic If Indicated (No Culture) - Urine, Clean Catch  -     PSA Screen  -     TSH Rfx On Abnormal To Free T4  -     Hemoglobin A1c    5. Prostate cancer screening  -     CBC & Differential  -     Comprehensive Metabolic Panel  -     Lipid Panel With LDL / HDL Ratio  -     Urinalysis With Microscopic If Indicated (No Culture) - Urine, Clean Catch  -     PSA Screen  -     TSH Rfx On Abnormal To Free T4  -     Hemoglobin A1c    6. Hyperglycemia  -     Hemoglobin A1c      Follow Up:  Next Medicare Wellness visit to be scheduled in 1 year.          Continue healthy diet and exercise vegetables vegetables vegetables appropriate screenings falls precaution  Immunization

## 2023-05-04 LAB
ALBUMIN SERPL-MCNC: 4.8 G/DL (ref 3.5–5.2)
ALBUMIN/GLOB SERPL: 2.1 G/DL
ALP SERPL-CCNC: 78 U/L (ref 39–117)
ALT SERPL-CCNC: 37 U/L (ref 1–41)
APPEARANCE UR: CLEAR
AST SERPL-CCNC: 45 U/L (ref 1–40)
BACTERIA #/AREA URNS HPF: NORMAL /HPF
BASOPHILS # BLD AUTO: 0.04 10*3/MM3 (ref 0–0.2)
BASOPHILS NFR BLD AUTO: 0.7 % (ref 0–1.5)
BILIRUB SERPL-MCNC: 1.6 MG/DL (ref 0–1.2)
BILIRUB UR QL STRIP: NEGATIVE
BUN SERPL-MCNC: 17 MG/DL (ref 8–23)
BUN/CREAT SERPL: 16.2 (ref 7–25)
CALCIUM SERPL-MCNC: 10 MG/DL (ref 8.6–10.5)
CASTS URNS MICRO: NORMAL
CHLORIDE SERPL-SCNC: 103 MMOL/L (ref 98–107)
CHOLEST SERPL-MCNC: 176 MG/DL (ref 0–200)
CO2 SERPL-SCNC: 31.1 MMOL/L (ref 22–29)
COLOR UR: YELLOW
CREAT SERPL-MCNC: 1.05 MG/DL (ref 0.76–1.27)
EGFRCR SERPLBLD CKD-EPI 2021: 78.8 ML/MIN/1.73
EOSINOPHIL # BLD AUTO: 0.19 10*3/MM3 (ref 0–0.4)
EOSINOPHIL NFR BLD AUTO: 3.5 % (ref 0.3–6.2)
EPI CELLS #/AREA URNS HPF: NORMAL /HPF
ERYTHROCYTE [DISTWIDTH] IN BLOOD BY AUTOMATED COUNT: 12.3 % (ref 12.3–15.4)
GLOBULIN SER CALC-MCNC: 2.3 GM/DL
GLUCOSE SERPL-MCNC: 88 MG/DL (ref 65–99)
GLUCOSE UR QL STRIP: NEGATIVE
HBA1C MFR BLD: 5.4 % (ref 4.8–5.6)
HCT VFR BLD AUTO: 39 % (ref 37.5–51)
HDLC SERPL-MCNC: 56 MG/DL (ref 40–60)
HGB BLD-MCNC: 13.5 G/DL (ref 13–17.7)
HGB UR QL STRIP: ABNORMAL
IMM GRANULOCYTES # BLD AUTO: 0.02 10*3/MM3 (ref 0–0.05)
IMM GRANULOCYTES NFR BLD AUTO: 0.4 % (ref 0–0.5)
KETONES UR QL STRIP: NEGATIVE
LDLC SERPL CALC-MCNC: 110 MG/DL (ref 0–100)
LDLC/HDLC SERPL: 1.95 {RATIO}
LEUKOCYTE ESTERASE UR QL STRIP: NEGATIVE
LYMPHOCYTES # BLD AUTO: 1.15 10*3/MM3 (ref 0.7–3.1)
LYMPHOCYTES NFR BLD AUTO: 21.2 % (ref 19.6–45.3)
MCH RBC QN AUTO: 33 PG (ref 26.6–33)
MCHC RBC AUTO-ENTMCNC: 34.6 G/DL (ref 31.5–35.7)
MCV RBC AUTO: 95.4 FL (ref 79–97)
MONOCYTES # BLD AUTO: 0.64 10*3/MM3 (ref 0.1–0.9)
MONOCYTES NFR BLD AUTO: 11.8 % (ref 5–12)
NEUTROPHILS # BLD AUTO: 3.39 10*3/MM3 (ref 1.7–7)
NEUTROPHILS NFR BLD AUTO: 62.4 % (ref 42.7–76)
NITRITE UR QL STRIP: NEGATIVE
NRBC BLD AUTO-RTO: 0 /100 WBC (ref 0–0.2)
PH UR STRIP: 6.5 [PH] (ref 5–8)
PLATELET # BLD AUTO: 219 10*3/MM3 (ref 140–450)
POTASSIUM SERPL-SCNC: 4.4 MMOL/L (ref 3.5–5.2)
PROT SERPL-MCNC: 7.1 G/DL (ref 6–8.5)
PROT UR QL STRIP: NEGATIVE
PSA SERPL-MCNC: 0.48 NG/ML (ref 0–4)
RBC # BLD AUTO: 4.09 10*6/MM3 (ref 4.14–5.8)
RBC #/AREA URNS HPF: NORMAL /HPF
SODIUM SERPL-SCNC: 141 MMOL/L (ref 136–145)
SP GR UR STRIP: 1.01 (ref 1–1.03)
TRIGL SERPL-MCNC: 53 MG/DL (ref 0–150)
TSH SERPL DL<=0.005 MIU/L-ACNC: 1.86 UIU/ML (ref 0.27–4.2)
UROBILINOGEN UR STRIP-MCNC: ABNORMAL MG/DL
VLDLC SERPL CALC-MCNC: 10 MG/DL (ref 5–40)
WBC # BLD AUTO: 5.43 10*3/MM3 (ref 3.4–10.8)
WBC #/AREA URNS HPF: NORMAL /HPF

## 2023-05-08 ENCOUNTER — TELEPHONE (OUTPATIENT)
Dept: FAMILY MEDICINE CLINIC | Facility: CLINIC | Age: 68
End: 2023-05-08

## 2023-05-08 NOTE — TELEPHONE ENCOUNTER
"   Hub staff attempted to follow warm transfer process and was unsuccessful     Caller: Hiram Echevarria Jr. \"Kuldeep\"    Relationship to patient: Self    Best call back number: [phone]    Patient is needing: PATIENT RETURNED LUIS'S PHONE CALL ABOUT HIS RESULTS. PLEASE CALL HIM BACK          "

## 2023-05-09 NOTE — TELEPHONE ENCOUNTER
Called patient and left detailed VM asking him to call this nurse back.       HUB on call back transfer to the office .

## 2023-05-10 NOTE — TELEPHONE ENCOUNTER
"  Hub staff attempted to follow warm transfer process and was unsuccessful     Caller: Hiram Echevarria Jr. \"Kuldeep\"    Relationship to patient: Self    Best call back number: [phone]    Patient is needing: PATIENT CALLED URSULA BACK. PLEASE CALL HIM BACK.           "

## 2023-05-11 ENCOUNTER — TELEPHONE (OUTPATIENT)
Dept: FAMILY MEDICINE CLINIC | Facility: CLINIC | Age: 68
End: 2023-05-11
Payer: COMMERCIAL

## 2023-05-11 DIAGNOSIS — R17 ELEVATED BILIRUBIN: Primary | ICD-10-CM

## 2023-05-11 DIAGNOSIS — R79.89 ELEVATED LIVER FUNCTION TESTS: ICD-10-CM

## 2023-06-13 ENCOUNTER — HOSPITAL ENCOUNTER (OUTPATIENT)
Dept: ULTRASOUND IMAGING | Facility: HOSPITAL | Age: 68
Discharge: HOME | End: 2023-06-13
Admitting: NURSE PRACTITIONER
Payer: COMMERCIAL

## 2023-06-13 PROCEDURE — 76705 ECHO EXAM OF ABDOMEN: CPT

## 2023-11-17 ENCOUNTER — OFFICE VISIT (OUTPATIENT)
Dept: FAMILY MEDICINE CLINIC | Facility: CLINIC | Age: 68
End: 2023-11-17
Payer: COMMERCIAL

## 2023-11-17 VITALS
OXYGEN SATURATION: 97 % | BODY MASS INDEX: 33.93 KG/M2 | DIASTOLIC BLOOD PRESSURE: 82 MMHG | WEIGHT: 237 LBS | HEIGHT: 70 IN | RESPIRATION RATE: 14 BRPM | TEMPERATURE: 97.3 F | SYSTOLIC BLOOD PRESSURE: 162 MMHG | HEART RATE: 72 BPM

## 2023-11-17 DIAGNOSIS — Z23 NEED FOR DIPHTHERIA-TETANUS-PERTUSSIS (TDAP) VACCINE: ICD-10-CM

## 2023-11-17 DIAGNOSIS — Z23 FLU VACCINE NEED: ICD-10-CM

## 2023-11-17 DIAGNOSIS — K12.0 CANKER SORES ORAL: ICD-10-CM

## 2023-11-17 DIAGNOSIS — Z23 NEED FOR PNEUMOCOCCAL VACCINATION: ICD-10-CM

## 2023-11-17 DIAGNOSIS — I10 PRIMARY HYPERTENSION: Primary | ICD-10-CM

## 2023-11-17 RX ORDER — LISINOPRIL 20 MG/1
20 TABLET ORAL DAILY
Qty: 90 TABLET | Refills: 1 | Status: SHIPPED | OUTPATIENT
Start: 2023-11-17

## 2023-11-17 NOTE — PATIENT INSTRUCTIONS
Discharge instructions     Prevnar 20 today Tdap today flu shot today    After 6 weeks get your second shingles vaccine,    Okay to take 600 mg ibuprofen  If needed for any body aches today or tomorrow or headache.    Continue healthy diet regular exercise  Continue therapeutic lifestyle changes, follow-up focus on decreasing bread Posta sweets modest weight loss over next 6 months to help control blood pressure  Mediterranean diet  Is healthy consider intermittent fasting,      Lisinopril 20 mg 1 tablet daily for blood pressure check blood pressure weekly    Should average less than 130/80 greater than 110/70 update me some blood pressure reading numbers a couple days in 3 weeks or so      If you get dizzy when standing, or if your blood pressures averaging less than 110 it may be too low  Let me know so we can adjust or proactively hold your medication or decrease and notify me    Caution to avoid falls especially avoid climbing during this transition with your new medicine

## 2023-11-17 NOTE — PROGRESS NOTES
"Chief Complaint  Mouth Lesions and Hypertension    Subjective        Hirma Echevarria Jr. presents to [facility]  History of Present Illness  Follow-up hypertension's been controlled at home, little high today he will recheck he is having no chest pain or shortness of breath.    Tends to get canker sores  Ulcers in his mouth various locations internally go away after couple weeks, none presently  No history of external lesions  No chronic sore throat or other difficulties      Htn 150\"-/89 no cp soa    Need vaccines     Had shingrex  Hypertension  Pertinent negatives include no anxiety or chest pain. There are no associated agents to hypertension. There are no compliance problems.        Objective   Vital Signs:  /82   Pulse 72   Temp 97.3 °F (36.3 °C) (Temporal)   Resp 14   Ht 177.8 cm (70\")   Wt 108 kg (237 lb)   SpO2 97%   BMI 34.01 kg/m²   Estimated body mass index is 34.01 kg/m² as calculated from the following:    Height as of this encounter: 177.8 cm (70\").    Weight as of this encounter: 108 kg (237 lb).            Physical Exam  Vitals reviewed.   Constitutional:       General: He is not in acute distress.     Appearance: He is well-developed. He is not ill-appearing, toxic-appearing or diaphoretic.   HENT:      Head: Normocephalic.      Nose: Nose normal.      Mouth/Throat:      Mouth: Mucous membranes are moist.      Pharynx: Oropharynx is clear.   Eyes:      General: No scleral icterus.     Conjunctiva/sclera: Conjunctivae normal.      Pupils: Pupils are equal, round, and reactive to light.   Neck:      Thyroid: No thyromegaly.      Vascular: No JVD.   Cardiovascular:      Rate and Rhythm: Normal rate and regular rhythm.      Heart sounds: Normal heart sounds. No murmur heard.     No friction rub. No gallop.   Pulmonary:      Effort: Pulmonary effort is normal. No respiratory distress.      Breath sounds: Normal breath sounds. No stridor. No wheezing or rales. "   Abdominal:      General: Bowel sounds are normal. There is no distension.      Palpations: Abdomen is soft.      Tenderness: There is no abdominal tenderness.      Comments: No hepatosplenomegaly, no ascites,   Musculoskeletal:         General: No tenderness.      Cervical back: Neck supple.   Lymphadenopathy:      Cervical: No cervical adenopathy.   Skin:     General: Skin is warm and dry.      Findings: No erythema or rash.   Neurological:      General: No focal deficit present.      Mental Status: He is alert and oriented to person, place, and time. Mental status is at baseline.      Deep Tendon Reflexes: Reflexes are normal and symmetric.   Psychiatric:         Mood and Affect: Mood normal.         Behavior: Behavior normal.         Thought Content: Thought content normal.         Judgment: Judgment normal.        Result Review :                Assessment and Plan   Diagnoses and all orders for this visit:    1. Primary hypertension (Primary)    2. Need for diphtheria-tetanus-pertussis (Tdap) vaccine  -     Cancel: Pneumococcal Conjugate Vaccine 20-Valent (PCV20)  -     Tdap Vaccine => 8yo IM (BOOSTRIX)    3. Need for pneumococcal vaccination  -     Cancel: Pneumococcal Conjugate Vaccine 20-Valent (PCV20)  -     Tdap Vaccine => 8yo IM (BOOSTRIX)  -     Pneumococcal Conjugate Vaccine 20-Valent All    4. Flu vaccine need  -     Fluzone High-Dose 65+yrs    5. Canker sores oral    Other orders  -     lisinopril (PRINIVIL,ZESTRIL) 20 MG tablet; Take 1 tablet by mouth Daily. For blood pressure  Dispense: 90 tablet; Refill: 1             Follow Up   No follow-ups on file.  Patient was given instructions and counseling regarding his condition or for health maintenance advice. Please see specific information pulled into the AVS if appropriate.         Patient Instructions   Discharge instructions     Prevnar 20 today Tdap today flu shot today    After 6 weeks get your second shingles vaccine,    Okay to take 600 mg  ibuprofen  If needed for any body aches today or tomorrow or headache.    Continue healthy diet regular exercise  Continue therapeutic lifestyle changes, follow-up focus on decreasing bread Posta sweets modest weight loss over next 6 months to help control blood pressure  Mediterranean diet  Is healthy consider intermittent fasting,      Lisinopril 20 mg 1 tablet daily for blood pressure check blood pressure weekly    Should average less than 130/80 greater than 110/70 update me some blood pressure reading numbers a couple days in 3 weeks or so      If you get dizzy when standing, or if your blood pressures averaging less than 110 it may be too low  Let me know so we can adjust or proactively hold your medication or decrease and notify me    Caution to avoid falls especially avoid climbing during this transition with your new medicine                   Answers submitted by the patient for this visit:  Primary Reason for Visit (Submitted on 11/17/2023)  What is the primary reason for your visit?: High Blood Pressure

## 2023-12-01 ENCOUNTER — HOSPITAL ENCOUNTER (EMERGENCY)
Facility: HOSPITAL | Age: 68
Discharge: HOME | End: 2023-12-01
Attending: EMERGENCY MEDICINE
Payer: COMMERCIAL

## 2023-12-01 ENCOUNTER — APPOINTMENT (OUTPATIENT)
Dept: CT IMAGING | Facility: HOSPITAL | Age: 68
End: 2023-12-01
Payer: COMMERCIAL

## 2023-12-01 VITALS
HEIGHT: 70 IN | TEMPERATURE: 98.1 F | SYSTOLIC BLOOD PRESSURE: 150 MMHG | RESPIRATION RATE: 18 BRPM | HEART RATE: 71 BPM | DIASTOLIC BLOOD PRESSURE: 67 MMHG | WEIGHT: 230 LBS | OXYGEN SATURATION: 97 % | BODY MASS INDEX: 32.93 KG/M2

## 2023-12-01 DIAGNOSIS — D53.9 MACROCYTIC ANEMIA: ICD-10-CM

## 2023-12-01 DIAGNOSIS — R10.31 RLQ ABDOMINAL PAIN: Primary | ICD-10-CM

## 2023-12-01 LAB
ALBUMIN SERPL-MCNC: 4.1 G/DL (ref 3.5–5.2)
ALBUMIN/GLOB SERPL: 2.1 G/DL
ALP SERPL-CCNC: 68 U/L (ref 39–117)
ALT SERPL W P-5'-P-CCNC: 30 U/L (ref 1–41)
ANION GAP SERPL CALCULATED.3IONS-SCNC: 7.8 MMOL/L (ref 5–15)
AST SERPL-CCNC: 35 U/L (ref 1–40)
BASOPHILS # BLD AUTO: 0.01 10*3/MM3 (ref 0–0.2)
BASOPHILS NFR BLD AUTO: 0.3 % (ref 0–1.5)
BILIRUB SERPL-MCNC: 1.2 MG/DL (ref 0–1.2)
BILIRUB UR QL STRIP: NEGATIVE
BUN SERPL-MCNC: 21 MG/DL (ref 8–23)
BUN/CREAT SERPL: 20.4 (ref 7–25)
CALCIUM SPEC-SCNC: 8.6 MG/DL (ref 8.6–10.5)
CHLORIDE SERPL-SCNC: 102 MMOL/L (ref 98–107)
CLARITY UR: CLEAR
CO2 SERPL-SCNC: 26.2 MMOL/L (ref 22–29)
COLOR UR: YELLOW
CREAT SERPL-MCNC: 1.03 MG/DL (ref 0.76–1.27)
DEPRECATED RDW RBC AUTO: 47.1 FL (ref 37–54)
EGFRCR SERPLBLD CKD-EPI 2021: 80.6 ML/MIN/1.73
EOSINOPHIL # BLD AUTO: 0.02 10*3/MM3 (ref 0–0.4)
EOSINOPHIL NFR BLD AUTO: 0.7 % (ref 0.3–6.2)
ERYTHROCYTE [DISTWIDTH] IN BLOOD BY AUTOMATED COUNT: 13 % (ref 12.3–15.4)
GLOBULIN UR ELPH-MCNC: 2 GM/DL
GLUCOSE SERPL-MCNC: 104 MG/DL (ref 65–99)
GLUCOSE UR STRIP-MCNC: NEGATIVE MG/DL
HCT VFR BLD AUTO: 37.2 % (ref 37.5–51)
HGB BLD-MCNC: 12.2 G/DL (ref 13–17.7)
HGB UR QL STRIP.AUTO: ABNORMAL
HOLD SPECIMEN: NORMAL
HOLD SPECIMEN: NORMAL
IMM GRANULOCYTES # BLD AUTO: 0 10*3/MM3 (ref 0–0.05)
IMM GRANULOCYTES NFR BLD AUTO: 0 % (ref 0–0.5)
KETONES UR QL STRIP: NEGATIVE
LEUKOCYTE ESTERASE UR QL STRIP.AUTO: NEGATIVE
LYMPHOCYTES # BLD AUTO: 0.65 10*3/MM3 (ref 0.7–3.1)
LYMPHOCYTES NFR BLD AUTO: 22.7 % (ref 19.6–45.3)
MCH RBC QN AUTO: 32.4 PG (ref 26.6–33)
MCHC RBC AUTO-ENTMCNC: 32.8 G/DL (ref 31.5–35.7)
MCV RBC AUTO: 98.7 FL (ref 79–97)
MONOCYTES # BLD AUTO: 0.65 10*3/MM3 (ref 0.1–0.9)
MONOCYTES NFR BLD AUTO: 22.7 % (ref 5–12)
NEUTROPHILS NFR BLD AUTO: 1.53 10*3/MM3 (ref 1.7–7)
NEUTROPHILS NFR BLD AUTO: 53.6 % (ref 42.7–76)
NITRITE UR QL STRIP: NEGATIVE
PH UR STRIP.AUTO: 6 [PH] (ref 5–8)
PLATELET # BLD AUTO: 166 10*3/MM3 (ref 140–450)
PMV BLD AUTO: 8.9 FL (ref 6–12)
POTASSIUM SERPL-SCNC: 3.7 MMOL/L (ref 3.5–5.2)
PROT SERPL-MCNC: 6.1 G/DL (ref 6–8.5)
PROT UR QL STRIP: NEGATIVE
RBC # BLD AUTO: 3.77 10*6/MM3 (ref 4.14–5.8)
SODIUM SERPL-SCNC: 136 MMOL/L (ref 136–145)
SP GR UR STRIP: 1.01 (ref 1–1.03)
UROBILINOGEN UR QL STRIP: ABNORMAL
WBC NRBC COR # BLD AUTO: 2.86 10*3/MM3 (ref 3.4–10.8)
WHOLE BLOOD HOLD COAG: NORMAL
WHOLE BLOOD HOLD SPECIMEN: NORMAL

## 2023-12-01 PROCEDURE — 74176 CT ABD & PELVIS W/O CONTRAST: CPT

## 2023-12-01 PROCEDURE — 81003 URINALYSIS AUTO W/O SCOPE: CPT | Performed by: EMERGENCY MEDICINE

## 2023-12-01 PROCEDURE — 80053 COMPREHEN METABOLIC PANEL: CPT | Performed by: PHYSICIAN ASSISTANT

## 2023-12-01 PROCEDURE — 99284 EMERGENCY DEPT VISIT MOD MDM: CPT

## 2023-12-01 PROCEDURE — 85025 COMPLETE CBC W/AUTO DIFF WBC: CPT | Performed by: PHYSICIAN ASSISTANT

## 2023-12-01 RX ORDER — ONDANSETRON 4 MG/1
4 TABLET, FILM COATED ORAL 4 TIMES DAILY PRN
Qty: 15 TABLET | Refills: 0 | Status: SHIPPED | OUTPATIENT
Start: 2023-12-01

## 2023-12-01 RX ORDER — SODIUM CHLORIDE 0.9 % (FLUSH) 0.9 %
10 SYRINGE (ML) INJECTION AS NEEDED
Status: DISCONTINUED | OUTPATIENT
Start: 2023-12-01 | End: 2023-12-01 | Stop reason: HOSPADM

## 2023-12-01 NOTE — ED NOTES
Pt here today for RLQ abdominal pain that has been worsening for the last few days. Reports one episode of vomitting last night.

## 2023-12-01 NOTE — FSED PROVIDER NOTE
Subjective   History of Present Illness  The patient is a 67-year-old male presenting today with right lower quadrant abdominal pain.  He said pain to the right lower quadrant intermittently over the last month.  Pain is typically mild to moderate and self resolving.  He cannot associate this pain with a bowel movement, body position, time of day or food ingestion.  Since yesterday the patient had 4 episodes of vomiting and the right lower quadrant pain has been worse during this time.  The patient states the pain is between a 6-8.  He has been having regular soft bowel movements.  He denies hematochezia.  No history of constipation.  He has had 3 colonoscopies over the last 10 years.  He has had a few polyps removed but no history of diverticulosis or diverticulitis.  He denies abdominal surgical history.  No fever, body aches or chills.  He has had some mild congestion and rhinorrhea over the last few days as well.  No vomiting in recent hours but he is also not attempted to eat.  No dysuria, urgency or frequency.  He has chronic hematuria which he sees urology for.  He has had multiple CT scans, MRIs, ultrasounds and cystoscopes to workup for chronic hematuria.        Review of Systems   All other systems reviewed and are negative.      Past Medical History:   Diagnosis Date    Asymptomatic microscopic hematuria 03/2018    negative  workkup    Bruit     LEFT CAROTID ARTERY    Colon polyps     FOLLOWED BY DR. SCARLET FERREIRA    Febrile illness 07/15/2020    GERD (gastroesophageal reflux disease)     Neutropenia     OA (osteoarthritis)     DOREEN (obstructive sleep apnea)     USES CPAP    Right cataract     Wears glasses        Allergies   Allergen Reactions    Morphine Nausea And Vomiting       Past Surgical History:   Procedure Laterality Date    CARPAL TUNNEL RELEASE Left 1998    CARPAL TUNNEL RELEASE Right 08/27/1999    DR. EDVIN ANDRADE AT [facility]    COLONOSCOPY N/A 12/06/2018    (3) 5 MM SESSILE TUBULAR ADENOMA  POLYPS, DIVERTICULOSIS, RESCOPE IN 3 YRS, DR. SCARLET FERREIRA AT [facility]    COLONOSCOPY N/A 03/23/2022    5 MM TUBULAR ADENOMA POLYP IN TRANSVERSE, RESCOPE IN 5 YRS, DR. SCARLET FERREIRA AT [facility]    COLONOSCOPY W/ POLYPECTOMY N/A 12/18/2013    5 MM ADENOMATOUS POLYP IN TRANSVERSE, DIVERTICULOSIS IN SIGMOID, RESCOPE IN 5 YRS, DR. SCARLET FERREIRA AT [facility]    KNEE ARTHROSCOPY Right 06/21/2005    DR. TARUN JURADO AT [place]    KNEE MENISCAL REPAIR Left 08/13/2018    DR. SERGIO ANDREW    LUMBAR DISC SURGERY Left 2002    L5-S1     TONSILLECTOMY Bilateral     TOTAL SHOULDER ARTHROPLASTY Right 09/30/2014    DR. SERGIO ANDREW AT [place]       Family History   Problem Relation Age of Onset    Hypertension Mother     Diabetes Mother     Hypertension Father     Diabetes Father     Stroke Father     Heart disease Father     Colon cancer Neg Hx     Prostate cancer Neg Hx     Malig Hyperthermia Neg Hx        Social History     Socioeconomic History    Marital status:    Tobacco Use    Smoking status: Never    Smokeless tobacco: Former     Types: Chew     Quit date: 6/15/2011   Substance and Sexual Activity    Alcohol use: Yes     Alcohol/week: 4.0 standard drinks of alcohol     Types: 4 Shots of liquor per week    Drug use: No    Sexual activity: Yes     Partners: Female     Birth control/protection: Post-menopausal           Objective   Physical Exam  Vitals and nursing note reviewed.   Constitutional:       General: He is not in acute distress.     Appearance: Normal appearance. He is not ill-appearing, toxic-appearing or diaphoretic.   HENT:      Head: Normocephalic and atraumatic.   Cardiovascular:      Rate and Rhythm: Normal rate and regular rhythm.      Heart sounds: Normal heart sounds.   Pulmonary:      Effort: Pulmonary effort is normal. No respiratory distress.      Breath sounds: Normal breath sounds.   Abdominal:      General: Abdomen is protuberant. Bowel sounds are normal.      Palpations: Abdomen is soft. There is no mass.       Tenderness: There is abdominal tenderness in the right lower quadrant. There is no right CVA tenderness, left CVA tenderness, guarding or rebound. Negative signs include Martinez's sign, Rovsing's sign and McBurney's sign.      Hernia: No hernia is present.   Skin:     General: Skin is warm and dry.   Neurological:      Mental Status: He is alert.   Psychiatric:         Mood and Affect: Mood normal.         Behavior: Behavior normal.         Thought Content: Thought content normal.         Judgment: Judgment normal.         Procedures           ED Course  ED Course as of 12/01/23 1710   Fri Dec 01, 2023   1528 CBC & Differential(!) [GABRIEL]      ED Course User Index  [GABRIEL] Cristina Leo PA                                           Medical Decision Making  The patient is a 67-year-old male presenting as above.  He is hypertensive upon arrival, otherwise normal vital signs.  He has right lower quadrant abdominal tenderness on exam.  Urinalysis reveals hematuria which is a chronic problem for the patient.  He denies urinary symptoms.  CBC reveals macrocytic anemia which is an acute problem for the patient.  CMP was otherwise unremarkable.  CT scan of the abdomen does not reveal kidney stones or other etiology to his pain.  He has diverticulosis but no evidence of diverticulitis.  The patient was given Zofran for nausea, recommend a bland diet, hydration, and recommend following up with his primary care provider to further discuss his abdominal pain and anemia.  We discussed return precautions such as fever, persistent vomiting or worsening abdominal pain.  He is agreeable.    Problems Addressed:  Macrocytic anemia: acute illness or injury  RLQ abdominal pain: acute illness or injury    Amount and/or Complexity of Data Reviewed  Labs: ordered. Decision-making details documented in ED Course.  Radiology: ordered.    Risk  Prescription drug management.        Final diagnoses:   Macrocytic anemia   RLQ abdominal pain       ED  Disposition  ED Disposition       ED Disposition   Discharge    Condition   Stable    Comment   --               Epley, James, APRN  [address]  [place] [address]  [phone]               Medication List        New Prescriptions      ondansetron 4 MG tablet  Commonly known as: ZOFRAN  Take 1 tablet by mouth 4 (Four) Times a Day As Needed for Nausea or Vomiting.               Where to Get Your Medications        These medications were sent to [id number] - [facility] [place] [address] AT [place] [address] - [phone]  - [phone] [address] [place] [address]      Phone: [phone]   ondansetron 4 MG tablet

## 2023-12-01 NOTE — DISCHARGE INSTRUCTIONS
Although you are being discharged from the ED today, I encourage you to return for worsening symptoms. Things can, and do, change such that treatment at home with medication may not be adequate. Specifically I recommend returning for chest pain or discomfort, difficulty breathing, persistent vomiting or difficulty holding down liquids or medications, fever > 102.0 F, worsening abdominal pain, blood in stool, lightheadedness or any other worsening or alarming symptoms.     Rest.  Follow up with PCP or provider listed for further evaluation and management of your abdominal pain and anemia.

## 2023-12-19 ENCOUNTER — OFFICE VISIT (OUTPATIENT)
Dept: FAMILY MEDICINE CLINIC | Facility: CLINIC | Age: 68
End: 2023-12-19
Payer: COMMERCIAL

## 2023-12-19 VITALS
DIASTOLIC BLOOD PRESSURE: 84 MMHG | OXYGEN SATURATION: 98 % | RESPIRATION RATE: 16 BRPM | HEART RATE: 68 BPM | WEIGHT: 234 LBS | HEIGHT: 70 IN | SYSTOLIC BLOOD PRESSURE: 143 MMHG | TEMPERATURE: 98.9 F | BODY MASS INDEX: 33.5 KG/M2

## 2023-12-19 DIAGNOSIS — D64.9 ANEMIA, UNSPECIFIED TYPE: ICD-10-CM

## 2023-12-19 DIAGNOSIS — R10.30 LOWER ABDOMINAL PAIN: Primary | ICD-10-CM

## 2023-12-19 PROCEDURE — 99213 OFFICE O/P EST LOW 20 MIN: CPT | Performed by: NURSE PRACTITIONER

## 2023-12-19 NOTE — PATIENT INSTRUCTIONS
Discharge instruction    Lots of fluids lots of fiber,  Recommend slowly adding Metamucil namebrand, such as orange flavor or which ever a teaspoon large glass of water with dinner and slowly over neck several weeks move up to 1 or 2 tablespoons high-fiber diet this is the mainstay of prevention of diverticulitis  Acute abdominal pain especially as associated with a fever decreased appetite constipation may be diverticulitis seek immediate treatment emergency room same day treatment here.    You have some mild anemia on your recent labs, I am simply repeating your labs today and expanding the test to make sure everything is okay here make sure we follow-up the results as well as for your Hemoccult      As long as you are doing well and feeling well  I will see you back in 6 months or so, and repeat labs at that time.    Your cough should resolve over the next couple weeks and it sounds like likely a postviral cough, if not recheck or send me a message to make sure we get a chest x-ray we need to have resolution of your cough in the next several weeks.

## 2023-12-19 NOTE — PROGRESS NOTES
"Chief Complaint  er follow up  (Er follow up from scans and anemia )    Subjective        Hiram Echevarria Jr. presents to [facility]  History of Present Illness  Follow-up recent emergency room visit for right lower quadrant pain with a CAT scan, no abscess or appendicitis was found to be anemic as well presently is better no chest pain shortness of breath no weakness no fever or chills he has no unexplained weight loss or night sweats,  He has had neutropenia in the past has been stable for some time apparently runs in his family per patient      Objective   Vital Signs:  /84   Pulse 68   Temp 98.9 °F (37.2 °C) (Infrared)   Resp 16   Ht 177.8 cm (70\")   Wt 106 kg (234 lb)   SpO2 98%   BMI 33.58 kg/m²   Estimated body mass index is 33.58 kg/m² as calculated from the following:    Height as of this encounter: 177.8 cm (70\").    Weight as of this encounter: 106 kg (234 lb).            Physical Exam  Vitals reviewed.   Constitutional:       General: He is not in acute distress.     Appearance: Normal appearance. He is well-developed. He is not ill-appearing, toxic-appearing or diaphoretic.   HENT:      Head: Normocephalic.      Nose: Nose normal.   Eyes:      General: No scleral icterus.     Conjunctiva/sclera: Conjunctivae normal.      Pupils: Pupils are equal, round, and reactive to light.   Neck:      Thyroid: No thyromegaly.      Vascular: No JVD.   Cardiovascular:      Rate and Rhythm: Normal rate and regular rhythm.      Heart sounds: Normal heart sounds. No murmur heard.     No friction rub. No gallop.   Pulmonary:      Effort: Pulmonary effort is normal. No respiratory distress.      Breath sounds: Normal breath sounds. No stridor. No wheezing or rales.   Abdominal:      General: Bowel sounds are normal. There is no distension.      Palpations: Abdomen is soft.      Tenderness: There is no abdominal tenderness.      Comments: No hepatosplenomegaly, no ascites, "   Musculoskeletal:         General: No tenderness.      Cervical back: Neck supple.   Lymphadenopathy:      Cervical: No cervical adenopathy.   Skin:     General: Skin is warm and dry.      Findings: No erythema or rash.   Neurological:      General: No focal deficit present.      Mental Status: He is alert and oriented to person, place, and time. Mental status is at baseline.      Deep Tendon Reflexes: Reflexes are normal and symmetric.   Psychiatric:         Behavior: Behavior normal.         Thought Content: Thought content normal.         Judgment: Judgment normal.        Result Review :                Assessment and Plan   Diagnoses and all orders for this visit:    1. Lower abdominal pain (Primary)    2. Anemia, unspecified type  -     Occult Blood, Fecal By Immunoassay - Stool, Per Rectum  -     CBC & Differential  -     Reticulocytes  -     Vitamin B12 & Folate  -     RITA + PE  -     RITA & PE, Random Urine - Urine, Clean Catch  -     Iron and TIBC  -     Ferritin           I spent 20  minutes caring for Hiram on this date of service. This time includes time spent by me in the following activities:preparing for the visit, reviewing tests, obtaining and/or reviewing a separately obtained history, performing a medically appropriate examination and/or evaluation , counseling and educating the patient/family/caregiver, ordering medications, tests, or procedures, documenting information in the medical record, and care coordination  Follow Up   No follow-ups on file.  Patient was given instructions and counseling regarding his condition or for health maintenance advice. Please see specific information pulled into the AVS if appropriate.     Patient Instructions   Discharge instruction    Lots of fluids lots of fiber,  Recommend slowly adding Metamucil namebrand, such as orange flavor or which ever a teaspoon large glass of water with dinner and slowly over neck several weeks move up to 1 or 2 tablespoons high-fiber  diet this is the mainstay of prevention of diverticulitis  Acute abdominal pain especially as associated with a fever decreased appetite constipation may be diverticulitis seek immediate treatment emergency room same day treatment here.    You have some mild anemia on your recent labs, I am simply repeating your labs today and expanding the test to make sure everything is okay here make sure we follow-up the results as well as for your Hemoccult      As long as you are doing well and feeling well  I will see you back in 6 months or so, and repeat labs at that time.    Your cough should resolve over the next couple weeks and it sounds like likely a postviral cough, if not recheck or send me a message to make sure we get a chest x-ray we need to have resolution of your cough in the next several weeks.

## 2023-12-20 ENCOUNTER — LAB (OUTPATIENT)
Dept: LAB | Facility: HOSPITAL | Age: 68
End: 2023-12-20
Payer: COMMERCIAL

## 2023-12-20 LAB
BASOPHILS # BLD AUTO: 0.03 10*3/MM3 (ref 0–0.2)
BASOPHILS NFR BLD AUTO: 0.6 % (ref 0–1.5)
DEPRECATED RDW RBC AUTO: 43.8 FL (ref 37–54)
EOSINOPHIL # BLD AUTO: 0.2 10*3/MM3 (ref 0–0.4)
EOSINOPHIL NFR BLD AUTO: 4.2 % (ref 0.3–6.2)
ERYTHROCYTE [DISTWIDTH] IN BLOOD BY AUTOMATED COUNT: 12.5 % (ref 12.3–15.4)
FERRITIN SERPL-MCNC: 244 NG/ML (ref 30–400)
FOLATE SERPL-MCNC: 6.73 NG/ML (ref 4.78–24.2)
HBA1C MFR BLD: 6 % (ref 4.8–5.6)
HCT VFR BLD AUTO: 37.2 % (ref 37.5–51)
HGB BLD-MCNC: 12.9 G/DL (ref 13–17.7)
IMM GRANULOCYTES # BLD AUTO: 0.03 10*3/MM3 (ref 0–0.05)
IMM GRANULOCYTES NFR BLD AUTO: 0.6 % (ref 0–0.5)
IRON 24H UR-MRATE: 93 MCG/DL (ref 59–158)
IRON SATN MFR SERPL: 28 % (ref 20–50)
LYMPHOCYTES # BLD AUTO: 1.47 10*3/MM3 (ref 0.7–3.1)
LYMPHOCYTES NFR BLD AUTO: 30.8 % (ref 19.6–45.3)
MCH RBC QN AUTO: 33.5 PG (ref 26.6–33)
MCHC RBC AUTO-ENTMCNC: 34.7 G/DL (ref 31.5–35.7)
MCV RBC AUTO: 96.6 FL (ref 79–97)
MONOCYTES # BLD AUTO: 0.42 10*3/MM3 (ref 0.1–0.9)
MONOCYTES NFR BLD AUTO: 8.8 % (ref 5–12)
NEUTROPHILS NFR BLD AUTO: 2.63 10*3/MM3 (ref 1.7–7)
NEUTROPHILS NFR BLD AUTO: 55 % (ref 42.7–76)
NRBC BLD AUTO-RTO: 0 /100 WBC (ref 0–0.2)
PLATELET # BLD AUTO: 255 10*3/MM3 (ref 140–450)
PMV BLD AUTO: 9.7 FL (ref 6–12)
RBC # BLD AUTO: 3.85 10*6/MM3 (ref 4.14–5.8)
RETICS # AUTO: 0.04 10*6/MM3 (ref 0.02–0.13)
RETICS/RBC NFR AUTO: 1.13 % (ref 0.7–1.9)
TIBC SERPL-MCNC: 331 MCG/DL (ref 298–536)
TRANSFERRIN SERPL-MCNC: 222 MG/DL (ref 200–360)
VIT B12 BLD-MCNC: 422 PG/ML (ref 211–946)
WBC NRBC COR # BLD AUTO: 4.78 10*3/MM3 (ref 3.4–10.8)

## 2023-12-20 PROCEDURE — 84466 ASSAY OF TRANSFERRIN: CPT | Performed by: NURSE PRACTITIONER

## 2023-12-20 PROCEDURE — 86335 IMMUNFIX E-PHORSIS/URINE/CSF: CPT | Performed by: NURSE PRACTITIONER

## 2023-12-20 PROCEDURE — 82728 ASSAY OF FERRITIN: CPT | Performed by: NURSE PRACTITIONER

## 2023-12-20 PROCEDURE — 82784 ASSAY IGA/IGD/IGG/IGM EACH: CPT | Performed by: NURSE PRACTITIONER

## 2023-12-20 PROCEDURE — 84155 ASSAY OF PROTEIN SERUM: CPT | Performed by: NURSE PRACTITIONER

## 2023-12-20 PROCEDURE — 84156 ASSAY OF PROTEIN URINE: CPT | Performed by: NURSE PRACTITIONER

## 2023-12-20 PROCEDURE — 85025 COMPLETE CBC W/AUTO DIFF WBC: CPT | Performed by: NURSE PRACTITIONER

## 2023-12-20 PROCEDURE — 83036 HEMOGLOBIN GLYCOSYLATED A1C: CPT | Performed by: NURSE PRACTITIONER

## 2023-12-20 PROCEDURE — 83540 ASSAY OF IRON: CPT | Performed by: NURSE PRACTITIONER

## 2023-12-20 PROCEDURE — 84166 PROTEIN E-PHORESIS/URINE/CSF: CPT | Performed by: NURSE PRACTITIONER

## 2023-12-20 PROCEDURE — 85045 AUTOMATED RETICULOCYTE COUNT: CPT | Performed by: NURSE PRACTITIONER

## 2023-12-20 PROCEDURE — 82746 ASSAY OF FOLIC ACID SERUM: CPT | Performed by: NURSE PRACTITIONER

## 2023-12-20 PROCEDURE — 36415 COLL VENOUS BLD VENIPUNCTURE: CPT | Performed by: NURSE PRACTITIONER

## 2023-12-20 PROCEDURE — 82607 VITAMIN B-12: CPT | Performed by: NURSE PRACTITIONER

## 2023-12-20 PROCEDURE — 86334 IMMUNOFIX E-PHORESIS SERUM: CPT | Performed by: NURSE PRACTITIONER

## 2023-12-20 PROCEDURE — 84165 PROTEIN E-PHORESIS SERUM: CPT | Performed by: NURSE PRACTITIONER

## 2023-12-21 LAB
ALBUMIN SERPL ELPH-MCNC: 3.8 G/DL (ref 2.9–4.4)
ALBUMIN/GLOB SERPL: 1.4 {RATIO} (ref 0.7–1.7)
ALPHA1 GLOB SERPL ELPH-MCNC: 0.2 G/DL (ref 0–0.4)
ALPHA2 GLOB SERPL ELPH-MCNC: 0.7 G/DL (ref 0.4–1)
B-GLOBULIN SERPL ELPH-MCNC: 0.8 G/DL (ref 0.7–1.3)
GAMMA GLOB SERPL ELPH-MCNC: 1.2 G/DL (ref 0.4–1.8)
GLOBULIN SER-MCNC: 2.9 G/DL (ref 2.2–3.9)
IGA SERPL-MCNC: 115 MG/DL (ref 61–437)
IGG SERPL-MCNC: 1098 MG/DL (ref 603–1613)
IGM SERPL-MCNC: 206 MG/DL (ref 20–172)
INTERPRETATION SERPL IEP-IMP: ABNORMAL
LABORATORY COMMENT REPORT: ABNORMAL
M PROTEIN SERPL ELPH-MCNC: ABNORMAL G/DL
PROT SERPL-MCNC: 6.7 G/DL (ref 6–8.5)

## 2023-12-27 ENCOUNTER — TELEPHONE (OUTPATIENT)
Dept: FAMILY MEDICINE CLINIC | Facility: CLINIC | Age: 68
End: 2023-12-27
Payer: MEDICARE

## 2023-12-27 NOTE — TELEPHONE ENCOUNTER
Left message for pt to call back and schedule and appointment with Hiram to go over results  ----- Message from Carter Singh MD sent at 12/26/2023  8:05 AM EST -----  Please call patient ........I am covering for Hiram who is on vacation for the [place] holiday.  There was lab work ordered that is suggestive of possible multiple myeloma.  The patient needs to either see Hiram for follow-up next week or get referred to a hematologist.

## 2024-01-03 ENCOUNTER — TELEPHONE (OUTPATIENT)
Dept: FAMILY MEDICINE CLINIC | Facility: CLINIC | Age: 69
End: 2024-01-03
Payer: MEDICARE

## 2024-01-03 DIAGNOSIS — D47.2 IGM MONOCLONAL GAMMOPATHY OF UNCERTAIN SIGNIFICANCE: Primary | ICD-10-CM

## 2024-01-03 DIAGNOSIS — D64.9 ANEMIA, UNSPECIFIED TYPE: ICD-10-CM

## 2024-01-03 DIAGNOSIS — R80.3: ICD-10-CM

## 2024-01-03 NOTE — TELEPHONE ENCOUNTER
Patient wife is aware referral has been placed. [facility] has the referral. I was unable to schedule as the provider there needs to be review before they will schedule.

## 2024-01-03 NOTE — TELEPHONE ENCOUNTER
Please sign orders. Hiram is out of the office this week and has no openings. The patient and his wife are worried with the possibility of multiple myelomas. They have asked that the referral be placed to speed up scheduling process and he is scheduled with Hiram to follow-up next week.

## 2024-01-04 ENCOUNTER — TELEPHONE (OUTPATIENT)
Dept: FAMILY MEDICINE CLINIC | Facility: CLINIC | Age: 69
End: 2024-01-04
Payer: MEDICARE

## 2024-01-08 ENCOUNTER — OFFICE VISIT (OUTPATIENT)
Dept: FAMILY MEDICINE CLINIC | Facility: CLINIC | Age: 69
End: 2024-01-08
Payer: MEDICARE

## 2024-01-08 VITALS
RESPIRATION RATE: 16 BRPM | HEIGHT: 70 IN | TEMPERATURE: 96.2 F | WEIGHT: 234 LBS | SYSTOLIC BLOOD PRESSURE: 137 MMHG | OXYGEN SATURATION: 99 % | HEART RATE: 73 BPM | BODY MASS INDEX: 33.5 KG/M2 | DIASTOLIC BLOOD PRESSURE: 86 MMHG

## 2024-01-08 DIAGNOSIS — D47.2 MONOCLONAL GAMMOPATHY PRESENT ON SERUM PROTEIN ELECTROPHORESIS: Primary | ICD-10-CM

## 2024-01-08 DIAGNOSIS — R10.31 ABDOMINAL PAIN, RIGHT LOWER QUADRANT: ICD-10-CM

## 2024-01-08 DIAGNOSIS — D64.9 ANEMIA, UNSPECIFIED TYPE: ICD-10-CM

## 2024-01-08 PROCEDURE — 99213 OFFICE O/P EST LOW 20 MIN: CPT | Performed by: NURSE PRACTITIONER

## 2024-01-08 NOTE — PATIENT INSTRUCTIONS
Discharge instructions, keep your appointment, with hematology, focus on healthy diet, positive attitude, good sleep, good balancing your health, let me know if there is anything I can do for you during the workup or if you need anything, as long as you are feeling well blood pressure is controlled, keep your appointment I will see back in 6 months should you have increasing or more frequent abdominal pain then follow-up with gastro let me know if any referral ever severe pain fever chills emergency room    No need to take iron you are not iron deficient and no reason to see gastro you are up-to-date with your colonoscopy no need to do your Hemoccult test at this time unless  Another physician NP suggest.

## 2024-01-08 NOTE — PROGRESS NOTES
"Chief Complaint  Follow-up (Scan follow up )    Subjective        Hiram Echevarria Jr. presents to [facility]  History of Present Illness  Pleasant patient here today follow-up right lower quadrant pain which much better, no abdominal pain presently has had a couple times this recur CAT scan negative and previous colonoscopy only a little over a year old  No fever chills night sweats unexplained weight loss, he had some anemia on his recent lab and with further evaluation it was found that he has an M spike with normal  Iron levels,  Dr. Worthington is already referred him to hematology and he is aware of the need to go the reason and a concerned about the possibility of multiple myeloma he needs further evaluation has an appointment already in a couple weeks      Objective   Vital Signs:  /86   Pulse 73   Temp 96.2 °F (35.7 °C) (Infrared)   Resp 16   Ht 177.8 cm (70\")   Wt 106 kg (234 lb)   SpO2 99%   BMI 33.58 kg/m²   Estimated body mass index is 33.58 kg/m² as calculated from the following:    Height as of this encounter: 177.8 cm (70\").    Weight as of this encounter: 106 kg (234 lb).            Physical Exam  Vitals reviewed.   Constitutional:       General: He is not in acute distress.     Appearance: Normal appearance. He is well-developed. He is not ill-appearing, toxic-appearing or diaphoretic.   HENT:      Head: Normocephalic.      Nose: Nose normal.   Eyes:      General: No scleral icterus.     Conjunctiva/sclera: Conjunctivae normal.      Pupils: Pupils are equal, round, and reactive to light.   Neck:      Thyroid: No thyromegaly.      Vascular: No JVD.   Cardiovascular:      Rate and Rhythm: Normal rate and regular rhythm.      Heart sounds: Normal heart sounds. No murmur heard.     No friction rub. No gallop.   Pulmonary:      Effort: Pulmonary effort is normal. No respiratory distress.      Breath sounds: Normal breath sounds. No stridor. No wheezing or rales. "   Abdominal:      General: Bowel sounds are normal. There is no distension.      Palpations: Abdomen is soft.      Tenderness: There is no abdominal tenderness.      Comments: No hepatosplenomegaly, no ascites,   Musculoskeletal:         General: No tenderness.      Cervical back: Neck supple.   Lymphadenopathy:      Cervical: No cervical adenopathy.   Skin:     General: Skin is warm and dry.      Findings: No erythema or rash.   Neurological:      General: No focal deficit present.      Mental Status: He is alert and oriented to person, place, and time. Mental status is at baseline.      Deep Tendon Reflexes: Reflexes are normal and symmetric.   Psychiatric:         Mood and Affect: Mood normal.         Behavior: Behavior normal.         Thought Content: Thought content normal.         Judgment: Judgment normal.        Result Review :                Assessment and Plan   Diagnoses and all orders for this visit:    1. Monoclonal gammopathy present on serum protein electrophoresis (Primary)    2. Anemia, unspecified type    3. Abdominal pain, right lower quadrant           I spent 20  minutes caring for Hiram on this date of service. This time includes time spent by me in the following activities:preparing for the visit, reviewing tests, obtaining and/or reviewing a separately obtained history, performing a medically appropriate examination and/or evaluation , counseling and educating the patient/family/caregiver, ordering medications, tests, or procedures, documenting information in the medical record, and care coordination  Follow Up   No follow-ups on file.  Patient was given instructions and counseling regarding his condition or for health maintenance advice. Please see specific information pulled into the AVS if appropriate.   Discussed labs including the M spike, he needs further evaluation to see if or not he has multiple myeloma as opposed to other phenomenon such as MGUS  Let me know when he needs along the  way for evaluation  If recurrent or increasing frequency abdominal pain he should see gastro let me know.  Patient Instructions         Discharge instructions, keep your appointment, with hematology, focus on healthy diet, positive attitude, good sleep, good balancing your health, let me know if there is anything I can do for you during the workup or if you need anything, as long as you are feeling well blood pressure is controlled, keep your appointment I will see back in 6 months should you have increasing or more frequent abdominal pain then follow-up with gastro let me know if any referral ever severe pain fever chills emergency room    No need to take iron you are not iron deficient and no reason to see gastro you are up-to-date with your colonoscopy no need to do your Hemoccult test at this time unless  Another physician NP suggest.

## 2024-02-16 ENCOUNTER — HOSPITAL ENCOUNTER (OUTPATIENT)
Dept: GENERAL RADIOLOGY | Facility: HOSPITAL | Age: 69
Discharge: HOME | End: 2024-02-16
Payer: MEDICARE

## 2024-02-16 ENCOUNTER — CONSULT (OUTPATIENT)
Dept: ONCOLOGY | Facility: CLINIC | Age: 69
End: 2024-02-16
Payer: COMMERCIAL

## 2024-02-16 ENCOUNTER — LAB (OUTPATIENT)
Dept: OTHER | Facility: HOSPITAL | Age: 69
End: 2024-02-16
Payer: COMMERCIAL

## 2024-02-16 VITALS
BODY MASS INDEX: 33.23 KG/M2 | OXYGEN SATURATION: 97 % | DIASTOLIC BLOOD PRESSURE: 84 MMHG | WEIGHT: 232.1 LBS | RESPIRATION RATE: 16 BRPM | HEART RATE: 67 BPM | HEIGHT: 70 IN | TEMPERATURE: 97.8 F | SYSTOLIC BLOOD PRESSURE: 151 MMHG

## 2024-02-16 DIAGNOSIS — D64.9 ANEMIA, UNSPECIFIED TYPE: ICD-10-CM

## 2024-02-16 DIAGNOSIS — D47.2 MONOCLONAL GAMMOPATHY PRESENT ON SERUM PROTEIN ELECTROPHORESIS: Primary | ICD-10-CM

## 2024-02-16 DIAGNOSIS — D64.9 ANEMIA, UNSPECIFIED TYPE: Primary | ICD-10-CM

## 2024-02-16 DIAGNOSIS — D47.2 MONOCLONAL GAMMOPATHY PRESENT ON SERUM PROTEIN ELECTROPHORESIS: ICD-10-CM

## 2024-02-16 LAB
ALBUMIN SERPL-MCNC: 4.5 G/DL (ref 3.5–5.2)
ALBUMIN/GLOB SERPL: 1.7 G/DL
ALP SERPL-CCNC: 75 U/L (ref 39–117)
ALT SERPL W P-5'-P-CCNC: 39 U/L (ref 1–41)
ANION GAP SERPL CALCULATED.3IONS-SCNC: 7.6 MMOL/L (ref 5–15)
AST SERPL-CCNC: 37 U/L (ref 1–40)
BASOPHILS # BLD AUTO: 0.02 10*3/MM3 (ref 0–0.2)
BASOPHILS NFR BLD AUTO: 0.5 % (ref 0–1.5)
BILIRUB SERPL-MCNC: 1.1 MG/DL (ref 0–1.2)
BUN SERPL-MCNC: 19 MG/DL (ref 8–23)
BUN/CREAT SERPL: 17.4 (ref 7–25)
CALCIUM SPEC-SCNC: 9.6 MG/DL (ref 8.6–10.5)
CHLORIDE SERPL-SCNC: 99 MMOL/L (ref 98–107)
CO2 SERPL-SCNC: 28.4 MMOL/L (ref 22–29)
CREAT SERPL-MCNC: 1.09 MG/DL (ref 0.76–1.27)
DEPRECATED RDW RBC AUTO: 44 FL (ref 37–54)
EGFRCR SERPLBLD CKD-EPI 2021: 74.9 ML/MIN/1.73
EOSINOPHIL # BLD AUTO: 0.11 10*3/MM3 (ref 0–0.4)
EOSINOPHIL NFR BLD AUTO: 2.6 % (ref 0.3–6.2)
ERYTHROCYTE [DISTWIDTH] IN BLOOD BY AUTOMATED COUNT: 12.7 % (ref 12.3–15.4)
GLOBULIN UR ELPH-MCNC: 2.7 GM/DL
GLUCOSE SERPL-MCNC: 99 MG/DL (ref 65–99)
HCT VFR BLD AUTO: 37.1 % (ref 37.5–51)
HGB BLD-MCNC: 12.6 G/DL (ref 13–17.7)
IMM GRANULOCYTES # BLD AUTO: 0.03 10*3/MM3 (ref 0–0.05)
IMM GRANULOCYTES NFR BLD AUTO: 0.7 % (ref 0–0.5)
LYMPHOCYTES # BLD AUTO: 1.3 10*3/MM3 (ref 0.7–3.1)
LYMPHOCYTES NFR BLD AUTO: 30.7 % (ref 19.6–45.3)
MCH RBC QN AUTO: 32.4 PG (ref 26.6–33)
MCHC RBC AUTO-ENTMCNC: 34 G/DL (ref 31.5–35.7)
MCV RBC AUTO: 95.4 FL (ref 79–97)
MONOCYTES # BLD AUTO: 0.39 10*3/MM3 (ref 0.1–0.9)
MONOCYTES NFR BLD AUTO: 9.2 % (ref 5–12)
NEUTROPHILS NFR BLD AUTO: 2.38 10*3/MM3 (ref 1.7–7)
NEUTROPHILS NFR BLD AUTO: 56.3 % (ref 42.7–76)
NRBC BLD AUTO-RTO: 0 /100 WBC (ref 0–0.2)
PLATELET # BLD AUTO: 225 10*3/MM3 (ref 140–450)
PMV BLD AUTO: 8.8 FL (ref 6–12)
POTASSIUM SERPL-SCNC: 4.5 MMOL/L (ref 3.5–5.2)
PROT SERPL-MCNC: 7.2 G/DL (ref 6–8.5)
RBC # BLD AUTO: 3.89 10*6/MM3 (ref 4.14–5.8)
SODIUM SERPL-SCNC: 135 MMOL/L (ref 136–145)
WBC NRBC COR # BLD AUTO: 4.23 10*3/MM3 (ref 3.4–10.8)

## 2024-02-16 PROCEDURE — 82784 ASSAY IGA/IGD/IGG/IGM EACH: CPT | Performed by: INTERNAL MEDICINE

## 2024-02-16 PROCEDURE — 83521 IG LIGHT CHAINS FREE EACH: CPT | Performed by: INTERNAL MEDICINE

## 2024-02-16 PROCEDURE — 86334 IMMUNOFIX E-PHORESIS SERUM: CPT | Performed by: INTERNAL MEDICINE

## 2024-02-16 PROCEDURE — 85025 COMPLETE CBC W/AUTO DIFF WBC: CPT | Performed by: INTERNAL MEDICINE

## 2024-02-16 PROCEDURE — 77075 RADEX OSSEOUS SURVEY COMPL: CPT

## 2024-02-16 PROCEDURE — 80053 COMPREHEN METABOLIC PANEL: CPT | Performed by: INTERNAL MEDICINE

## 2024-02-16 PROCEDURE — 84165 PROTEIN E-PHORESIS SERUM: CPT | Performed by: INTERNAL MEDICINE

## 2024-02-16 PROCEDURE — 36415 COLL VENOUS BLD VENIPUNCTURE: CPT

## 2024-02-19 ENCOUNTER — PATIENT ROUNDING (BHMG ONLY) (OUTPATIENT)
Dept: ONCOLOGY | Facility: CLINIC | Age: 69
End: 2024-02-19
Payer: COMMERCIAL

## 2024-02-19 ENCOUNTER — LAB (OUTPATIENT)
Dept: OTHER | Facility: HOSPITAL | Age: 69
End: 2024-02-19
Payer: COMMERCIAL

## 2024-02-19 LAB
ALBUMIN SERPL ELPH-MCNC: 3.7 G/DL (ref 2.9–4.4)
ALBUMIN/GLOB SERPL: 1.3 {RATIO} (ref 0.7–1.7)
ALPHA1 GLOB SERPL ELPH-MCNC: 0.2 G/DL (ref 0–0.4)
ALPHA2 GLOB SERPL ELPH-MCNC: 0.7 G/DL (ref 0.4–1)
B-GLOBULIN SERPL ELPH-MCNC: 0.8 G/DL (ref 0.7–1.3)
GAMMA GLOB SERPL ELPH-MCNC: 1.2 G/DL (ref 0.4–1.8)
GLOBULIN SER-MCNC: 3 G/DL (ref 2.2–3.9)
IGA SERPL-MCNC: 112 MG/DL (ref 61–437)
IGG SERPL-MCNC: 1023 MG/DL (ref 603–1613)
IGM SERPL-MCNC: 204 MG/DL (ref 20–172)
INTERPRETATION SERPL IEP-IMP: ABNORMAL
KAPPA LC FREE SER-MCNC: 23.2 MG/L (ref 3.3–19.4)
KAPPA LC FREE/LAMBDA FREE SER: 1.58 {RATIO} (ref 0.26–1.65)
LABORATORY COMMENT REPORT: ABNORMAL
LAMBDA LC FREE SERPL-MCNC: 14.7 MG/L (ref 5.7–26.3)
M PROTEIN SERPL ELPH-MCNC: ABNORMAL G/DL
PROT SERPL-MCNC: 6.7 G/DL (ref 6–8.5)

## 2024-02-19 PROCEDURE — 83521 IG LIGHT CHAINS FREE EACH: CPT | Performed by: INTERNAL MEDICINE

## 2024-02-19 NOTE — PROGRESS NOTES
A My-Chart message has been sent to the patient for PATIENT ROUNDING with [facility] [place].

## 2024-02-21 LAB
KAPPA LC FREE 24H UR-MRATE: 18.54 MG/24 HR
KAPPA LC FREE UR-MCNC: 9.86 MG/L (ref 1.17–86.46)
KAPPA LC FREE/LAMBDA FREE UR: 14.09 (ref 1.83–14.26)
LAMBDA LC FREE 24H UR-MRATE: 1.32 MG/24 HR
LAMBDA LC FREE UR-MCNC: 0.7 MG/L (ref 0.27–15.21)

## 2024-03-08 ENCOUNTER — OFFICE VISIT (OUTPATIENT)
Dept: ONCOLOGY | Facility: CLINIC | Age: 69
End: 2024-03-08
Payer: COMMERCIAL

## 2024-03-08 VITALS
WEIGHT: 233.3 LBS | OXYGEN SATURATION: 98 % | HEIGHT: 70 IN | HEART RATE: 77 BPM | SYSTOLIC BLOOD PRESSURE: 130 MMHG | RESPIRATION RATE: 18 BRPM | TEMPERATURE: 97.7 F | BODY MASS INDEX: 33.4 KG/M2 | DIASTOLIC BLOOD PRESSURE: 76 MMHG

## 2024-03-08 DIAGNOSIS — R31.21 ASYMPTOMATIC MICROSCOPIC HEMATURIA: Primary | ICD-10-CM

## 2024-03-08 DIAGNOSIS — D47.2 MONOCLONAL GAMMOPATHY PRESENT ON SERUM PROTEIN ELECTROPHORESIS: ICD-10-CM

## 2024-03-08 NOTE — LETTER
March 8, 2024     James Epley, APRN  [address]  [address]  [facility] [address]    Patient: Hiram Echevarria Jr.   YOB: 1955   Date of Visit: 3/8/2024     Dear James Epley, APRN:       Thank you for referring Hiram Echevarria to me for evaluation. Below are the relevant portions of my assessment and plan of care.    If you have questions, please do not hesitate to call me. I look forward to following Hiram along with you.         Sincerely,        Slick Roman MD        CC: No Recipients    Slick Roman MD  03/08/24 1544  Sign when Signing Visit    Subjective    REASON FOR FOLLOW UP: Kappa light chains detected in spot random urine.  24-hour urine free light chains were unremarkable.    HISTORY OF PRESENT ILLNESS:  The patient is a 68 y.o. year old male who was noted to have microscopic hematuria for some time and has developed a mild anemia.  As part of his workup for these issues he had a random urine immunofixation performed which detected increased kappa light chains in the urine.  His serum protein electrophoresis did not show any serum monoclonal gammopathy.  Quantitative immunoglobulins showed only a mild increase in IgM of 206.  IgG and IgA were within normal limits.    His mild anemia has not been associated with any obvious deficiency state with normal levels of iron, B12, and folate.  His reticulocyte count was normal.    Serum chemistries are unremarkable.    With the initial consult visit of 2/16/2024, I told Mr. Echevarria that we would need to perform some additional studies to further evaluate the increased kappa light chains in the urine.    INTERVAL HISTORY:  He returns today having collected a 24-hour urine for urine free light chains.  This result from 2/19/2024 was completely normal.    We also performed serum protein electrophoresis which did not show any monoclonal paraprotein.  His kappa light chains were mildly elevated but kappa lambda ratio was normal.    He also had a  bone survey performed showing no lytic lesions.  On his serum chemistries his calcium and creatinine remain normal.    I discussed these findings with the patient in the office today and reassured him that we do not see any significant abnormalities.    History of Present Illness     Past Medical History:   Diagnosis Date   • Asymptomatic microscopic hematuria 03/2018    negative  workkup   • Bruit     LEFT CAROTID ARTERY   • Colon polyps     FOLLOWED BY DR. SCARLET FERREIRA   • Febrile illness 07/15/2020   • GERD (gastroesophageal reflux disease)    • Neutropenia    • OA (osteoarthritis)    • DOREEN (obstructive sleep apnea)     USES CPAP   • Right cataract    • Wears glasses         Past Surgical History:   Procedure Laterality Date   • CARPAL TUNNEL RELEASE Left 1998   • CARPAL TUNNEL RELEASE Right 08/27/1999    DR. EDVIN ANDRADE AT [place]   • COLONOSCOPY N/A 12/06/2018    (3) 5 MM SESSILE TUBULAR ADENOMA POLYPS, DIVERTICULOSIS, RESCOPE IN 3 YRS, DR. SCARLET FERREIRA AT [facility]   • COLONOSCOPY N/A 03/23/2022    5 MM TUBULAR ADENOMA POLYP IN TRANSVERSE, RESCOPE IN 5 YRS, DR. SCARLET FERREIRA AT [facility]   • COLONOSCOPY W/ POLYPECTOMY N/A 12/18/2013    5 MM ADENOMATOUS POLYP IN TRANSVERSE, DIVERTICULOSIS IN SIGMOID, RESCOPE IN 5 YRS, DR. SCARLET FERREIRA AT [facility]   • KNEE ARTHROSCOPY Right 06/21/2005    DR. TARUN JURADO AT [place]   • KNEE MENISCAL REPAIR Left 08/13/2018    DR. SERGIO ANDREW   • LUMBAR DISC SURGERY Left 2002    L5-S1    • TONSILLECTOMY Bilateral    • TOTAL SHOULDER ARTHROPLASTY Right 09/30/2014    DR. SERGIO ANDREW AT [facility]        Current Outpatient Medications on File Prior to Visit   Medication Sig Dispense Refill   • Influenza Vac High-Dose Quad (Fluzone High-Dose Quadrivalent) 0.7 ML suspension prefilled syringe injection Inject 0.7 mL into the appropriate muscle as directed by prescriber. 0.7 mL 0   • lisinopril (PRINIVIL,ZESTRIL) 20 MG tablet Take 1 tablet by mouth Daily. For blood pressure 90 tablet 1   • ondansetron  (ZOFRAN) 4 MG tablet Take 1 tablet by mouth 4 (Four) Times a Day As Needed for Nausea or Vomiting. 15 tablet 0   • Zoster Vac Recomb Adjuvanted (Shingrix) 50 MCG/0.5ML reconstituted suspension Inject 0.5 mL into the appropriate muscle as directed by prescriber. 0.5 mL 0     No current facility-administered medications on file prior to visit.        ALLERGIES:    Allergies   Allergen Reactions   • Morphine Nausea And Vomiting        Social History     Socioeconomic History   • Marital status:    Tobacco Use   • Smoking status: Never   • Smokeless tobacco: Former     Types: Chew     Quit date: 6/15/2011   Substance and Sexual Activity   • Alcohol use: Yes     Alcohol/week: 4.0 standard drinks of alcohol     Types: 4 Shots of liquor per week   • Drug use: No   • Sexual activity: Yes     Partners: Female     Birth control/protection: Post-menopausal        Family History   Problem Relation Age of Onset   • Hypertension Mother    • Diabetes Mother    • Hypertension Father    • Diabetes Father    • Stroke Father    • Heart disease Father    • Colon cancer Neg Hx    • Prostate cancer Neg Hx    • Malig Hyperthermia Neg Hx         Review of Systems   Constitutional:  Negative for activity change, chills, fatigue and fever.   HENT:  Negative for mouth sores, trouble swallowing and voice change.    Eyes:  Negative for pain and visual disturbance.   Respiratory:  Negative for cough, shortness of breath and wheezing.    Cardiovascular:  Negative for chest pain and palpitations.   Gastrointestinal:  Negative for abdominal pain, constipation, diarrhea, nausea and vomiting.        Reports intermittent right lower quadrant abdominal pain   Genitourinary:  Negative for difficulty urinating, frequency and urgency.   Musculoskeletal:  Negative for arthralgias and joint swelling.   Skin:  Negative for rash.   Neurological:  Negative for dizziness, seizures, weakness and headaches.   Hematological:  Negative for adenopathy. Does  "not bruise/bleed easily.   Psychiatric/Behavioral:  Negative for behavioral problems and confusion. The patient is not nervous/anxious.         Objective    Vitals:    03/08/24 1527   Resp: 18   Temp: 97.7 °F (36.5 °C)   TempSrc: Temporal   Weight: 106 kg (233 lb 4.8 oz)   Height: 177 cm (69.69\")   PainSc: 0-No pain         3/8/2024     3:25 PM   Current Status   ECOG score 0       Physical Exam  Constitutional:       General: He is not in acute distress.     Appearance: He is well-developed.   HENT:      Head: Normocephalic.   Eyes:      General: No scleral icterus.     Conjunctiva/sclera: Conjunctivae normal.      Pupils: Pupils are equal, round, and reactive to light.   Neck:      Thyroid: No thyromegaly.      Vascular: No JVD.   Cardiovascular:      Rate and Rhythm: Normal rate and regular rhythm.      Heart sounds: No murmur heard.     No friction rub. No gallop.   Pulmonary:      Effort: Pulmonary effort is normal.      Breath sounds: Normal breath sounds. No wheezing or rales.   Abdominal:      General: There is no distension.      Palpations: Abdomen is soft. There is no mass.      Tenderness: There is no abdominal tenderness.   Musculoskeletal:         General: No deformity. Normal range of motion.      Cervical back: Normal range of motion and neck supple.   Lymphadenopathy:      Cervical: No cervical adenopathy.   Skin:     General: Skin is warm and dry.      Findings: No erythema or rash.   Neurological:      Mental Status: He is alert and oriented to person, place, and time.      Cranial Nerves: No cranial nerve deficit.      Deep Tendon Reflexes: Reflexes are normal and symmetric.   Psychiatric:         Behavior: Behavior normal.         Judgment: Judgment normal.           RECENT LABS:  Hematology WBC   Date Value Ref Range Status   02/16/2024 4.23 3.40 - 10.80 10*3/mm3 Final   05/03/2023 5.43 3.40 - 10.80 10*3/mm3 Final   07/24/2018 4.22 (L) 4.5 - 11.0 10*3/uL Final     RBC   Date Value Ref Range " Status   02/16/2024 3.89 (L) 4.14 - 5.80 10*6/mm3 Final   05/03/2023 4.09 (L) 4.14 - 5.80 10*6/mm3 Final   07/24/2018 4.30 (L) 4.5 - 5.9 10*6/uL Final     Hemoglobin   Date Value Ref Range Status   02/16/2024 12.6 (L) 13.0 - 17.7 g/dL Final   07/24/2018 14.1 13.5 - 17.5 g/dL Final     Hematocrit   Date Value Ref Range Status   02/16/2024 37.1 (L) 37.5 - 51.0 % Final   07/24/2018 40.5 (L) 41.0 - 53.0 % Final     Platelets   Date Value Ref Range Status   02/16/2024 225 140 - 450 10*3/mm3 Final   07/24/2018 199 140 - 440 10*3/uL Final        Lab Results   Component Value Date    NEUTROABS 2.38 02/16/2024       Lab Results   Component Value Date    GLUCOSE 99 02/16/2024    BUN 19 02/16/2024    CREATININE 1.09 02/16/2024    EGFRRESULT 78.8 05/03/2023    EGFR 74.9 02/16/2024    BCR 17.4 02/16/2024    K 4.5 02/16/2024    CO2 28.4 02/16/2024    CALCIUM 9.6 02/16/2024    PROTENTOTREF 6.7 02/16/2024    ALBUMIN 3.7 02/16/2024    ALBUMIN 4.5 02/16/2024    BILITOT 1.1 02/16/2024    AST 37 02/16/2024    ALT 39 02/16/2024     Lab Results   Component Value Date    IRON 93 12/20/2023    TIBC 331 12/20/2023    FERRITIN 244.00 12/20/2023     Lab Results   Component Value Date    CDYRJYGJ87 422 12/20/2023     Lab Results   Component Value Date    FOLATE 6.73 12/20/2023          SERUM RITA + PE 2/16/2024  IgG  603 - 1613 mg/dL 1023  1098       IgA  61 - 437 mg/dL 112  115       IgM  20 - 172 mg/dL 204 High   206 High        Total Protein  6.0 - 8.5 g/dL 6.7 7.2 6.7 6.1 7.1 7.1 7.0   Albumin  2.9 - 4.4 g/dL 3.7 4.5 R 3.8 4.1 R 4.8 R 4.80 R 4.50 R   Alpha-1-Globulin  0.0 - 0.4 g/dL 0.2  0.2       Alpha-2-Globulin  0.4 - 1.0 g/dL 0.7  0.7       Beta Globulin  0.7 - 1.3 g/dL 0.8  0.8       Gamma Globulin  0.4 - 1.8 g/dL 1.2  1.2       M-Chris  Not Observed g/dL Not Observed  Not Observed       Globulin  2.2 - 3.9 g/dL 3.0  2.9  2.3 R 2.3 R    A/G Ratio  0.7 - 1.7 1.3 1.7 R 1.4 2.1 R 2.1 R 2.1 R 1.8 R   Immunofixation Reflex, Serum Comment   Comment CM       Comment: No monoclonality detected.     Free Light Chain, Kappa  3.3 - 19.4 mg/L 23.2 High          Free Lambda Light Chains  5.7 - 26.3 mg/L 14.7         Kappa/Lambda Ratio  0.26 - 1.65 1.58             24 Hr URINE LIGHT CHAINS  Component  Ref Range & Units 2 wk ago   Free Kappa Lt Chains,Ur  1.17 - 86.46 mg/L 9.86   Free [place] Lt Chains, 24hr  Undefined mg/24 hr 18.54   Free Lambda Lt Chains,Ur  0.27 - 15.21 mg/L 0.70   Free Lambda Lt Chains, 24 Hr  Undefined mg/24 hr 1.32   Kappa/Lambda Ratio,U  1.83 - 14.26 14.09         IMAGING:  SKELETAL BONE SURVEY 2/16/2024   HISTORY: Monoclonal gammopathy. Evaluate for lytic lesions.     FINDINGS:  The examination consists of 25 images. There are no  suspicious lytic lesions throughout the skeleton. There are no  compression fractures in the spine.     CONCLUSION: Negative skeletal bone survey.      CT ABDOMEN PELVIS WO CONTRAST 12/1/2023  IMPRESSION:  Impression:     1. No acute CT abnormalities in the abdomen and pelvis     2. Colon diverticulosis with no evidence of acute diverticulitis      Assessment & Plan  1.  Free kappa light chains noted on the random urine immunofixation.  As noted above on his 24-hour urine collection there was no abnormality of his urine light chains noted.  2.  Mild anemia with no evidence of deficiency state or obvious bleeding.  3.  History of microscopic hematuria previously evaluated by urology with no abnormal findings.  CT scan of the abdomen pelvis showed no evidence of renal masses.    PLAN  1.  I reviewed the lab results and bone survey results with the patient and provided in printed copies of the reports.  I reassured him that we do not see any worrisome findings and at this time I do not feel he needs any additional workup.  2.  We have not scheduled routine follow-up in our office but certainly we would be happy to see Kuldeep again anytime in the future if new concerns arise.    Thanks for allowing us to see this nice  gentleman in consultation

## 2024-03-08 NOTE — PROGRESS NOTES
Subjective     REASON FOR FOLLOW UP: Kappa light chains detected in spot random urine.  24-hour urine free light chains were unremarkable.    HISTORY OF PRESENT ILLNESS:  The patient is a 68 y.o. year old male who was noted to have microscopic hematuria for some time and has developed a mild anemia.  As part of his workup for these issues he had a random urine immunofixation performed which detected increased kappa light chains in the urine.  His serum protein electrophoresis did not show any serum monoclonal gammopathy.  Quantitative immunoglobulins showed only a mild increase in IgM of 206.  IgG and IgA were within normal limits.    His mild anemia has not been associated with any obvious deficiency state with normal levels of iron, B12, and folate.  His reticulocyte count was normal.    Serum chemistries are unremarkable.    With the initial consult visit of 2/16/2024, I told Mr. Echevarria that we would need to perform some additional studies to further evaluate the increased kappa light chains in the urine.    INTERVAL HISTORY:  He returns today having collected a 24-hour urine for urine free light chains.  This result from 2/19/2024 was completely normal.    We also performed serum protein electrophoresis which did not show any monoclonal paraprotein.  His kappa light chains were mildly elevated but kappa lambda ratio was normal.    He also had a bone survey performed showing no lytic lesions.  On his serum chemistries his calcium and creatinine remain normal.    I discussed these findings with the patient in the office today and reassured him that we do not see any significant abnormalities.    History of Present Illness     Past Medical History:   Diagnosis Date    Asymptomatic microscopic hematuria 03/2018    negative  workkup    Bruit     LEFT CAROTID ARTERY    Colon polyps     FOLLOWED BY DR. SCARLET FERREIRA    Febrile illness 07/15/2020    GERD (gastroesophageal reflux disease)     Neutropenia     OA  (osteoarthritis)     DOREEN (obstructive sleep apnea)     USES CPAP    Right cataract     Wears glasses         Past Surgical History:   Procedure Laterality Date    CARPAL TUNNEL RELEASE Left 1998    CARPAL TUNNEL RELEASE Right 08/27/1999    DR. EDVIN ANDRADE AT [place]    COLONOSCOPY N/A 12/06/2018    (3) 5 MM SESSILE TUBULAR ADENOMA POLYPS, DIVERTICULOSIS, RESCOPE IN 3 YRS, DR. SCARLET FERREIRA AT [facility]    COLONOSCOPY N/A 03/23/2022    5 MM TUBULAR ADENOMA POLYP IN TRANSVERSE, RESCOPE IN 5 YRS, DR. SCARLET FERREIRA AT [facility]    COLONOSCOPY W/ POLYPECTOMY N/A 12/18/2013    5 MM ADENOMATOUS POLYP IN TRANSVERSE, DIVERTICULOSIS IN SIGMOID, RESCOPE IN 5 YRS, DR. SCARLET FERREIRA AT [facility]    KNEE ARTHROSCOPY Right 06/21/2005    DR. TARUN JURADO AT [place]    KNEE MENISCAL REPAIR Left 08/13/2018    DR. SERGIO ANDREW    LUMBAR DISC SURGERY Left 2002    L5-S1     TONSILLECTOMY Bilateral     TOTAL SHOULDER ARTHROPLASTY Right 09/30/2014    DR. SERGIO ANDREW AT [place]        Current Outpatient Medications on File Prior to Visit   Medication Sig Dispense Refill    Influenza Vac High-Dose Quad (Fluzone High-Dose Quadrivalent) 0.7 ML suspension prefilled syringe injection Inject 0.7 mL into the appropriate muscle as directed by prescriber. 0.7 mL 0    lisinopril (PRINIVIL,ZESTRIL) 20 MG tablet Take 1 tablet by mouth Daily. For blood pressure 90 tablet 1    ondansetron (ZOFRAN) 4 MG tablet Take 1 tablet by mouth 4 (Four) Times a Day As Needed for Nausea or Vomiting. 15 tablet 0    Zoster Vac Recomb Adjuvanted (Shingrix) 50 MCG/0.5ML reconstituted suspension Inject 0.5 mL into the appropriate muscle as directed by prescriber. 0.5 mL 0     No current facility-administered medications on file prior to visit.        ALLERGIES:    Allergies   Allergen Reactions    Morphine Nausea And Vomiting        Social History     Socioeconomic History    Marital status:    Tobacco Use    Smoking status: Never    Smokeless tobacco: Former     Types: Chew     Quit  "date: 6/15/2011   Substance and Sexual Activity    Alcohol use: Yes     Alcohol/week: 4.0 standard drinks of alcohol     Types: 4 Shots of liquor per week    Drug use: No    Sexual activity: Yes     Partners: Female     Birth control/protection: Post-menopausal        Family History   Problem Relation Age of Onset    Hypertension Mother     Diabetes Mother     Hypertension Father     Diabetes Father     Stroke Father     Heart disease Father     Colon cancer Neg Hx     Prostate cancer Neg Hx     Malig Hyperthermia Neg Hx         Review of Systems   Constitutional:  Negative for activity change, chills, fatigue and fever.   HENT:  Negative for mouth sores, trouble swallowing and voice change.    Eyes:  Negative for pain and visual disturbance.   Respiratory:  Negative for cough, shortness of breath and wheezing.    Cardiovascular:  Negative for chest pain and palpitations.   Gastrointestinal:  Negative for abdominal pain, constipation, diarrhea, nausea and vomiting.        Reports intermittent right lower quadrant abdominal pain   Genitourinary:  Negative for difficulty urinating, frequency and urgency.   Musculoskeletal:  Negative for arthralgias and joint swelling.   Skin:  Negative for rash.   Neurological:  Negative for dizziness, seizures, weakness and headaches.   Hematological:  Negative for adenopathy. Does not bruise/bleed easily.   Psychiatric/Behavioral:  Negative for behavioral problems and confusion. The patient is not nervous/anxious.         Objective     Vitals:    03/08/24 1527   Resp: 18   Temp: 97.7 °F (36.5 °C)   TempSrc: Temporal   Weight: 106 kg (233 lb 4.8 oz)   Height: 177 cm (69.69\")   PainSc: 0-No pain         3/8/2024     3:25 PM   Current Status   ECOG score 0       Physical Exam  Constitutional:       General: He is not in acute distress.     Appearance: He is well-developed.   HENT:      Head: Normocephalic.   Eyes:      General: No scleral icterus.     Conjunctiva/sclera: Conjunctivae " normal.      Pupils: Pupils are equal, round, and reactive to light.   Neck:      Thyroid: No thyromegaly.      Vascular: No JVD.   Cardiovascular:      Rate and Rhythm: Normal rate and regular rhythm.      Heart sounds: No murmur heard.     No friction rub. No gallop.   Pulmonary:      Effort: Pulmonary effort is normal.      Breath sounds: Normal breath sounds. No wheezing or rales.   Abdominal:      General: There is no distension.      Palpations: Abdomen is soft. There is no mass.      Tenderness: There is no abdominal tenderness.   Musculoskeletal:         General: No deformity. Normal range of motion.      Cervical back: Normal range of motion and neck supple.   Lymphadenopathy:      Cervical: No cervical adenopathy.   Skin:     General: Skin is warm and dry.      Findings: No erythema or rash.   Neurological:      Mental Status: He is alert and oriented to person, place, and time.      Cranial Nerves: No cranial nerve deficit.      Deep Tendon Reflexes: Reflexes are normal and symmetric.   Psychiatric:         Behavior: Behavior normal.         Judgment: Judgment normal.           RECENT LABS:  Hematology WBC   Date Value Ref Range Status   02/16/2024 4.23 3.40 - 10.80 10*3/mm3 Final   05/03/2023 5.43 3.40 - 10.80 10*3/mm3 Final   07/24/2018 4.22 (L) 4.5 - 11.0 10*3/uL Final     RBC   Date Value Ref Range Status   02/16/2024 3.89 (L) 4.14 - 5.80 10*6/mm3 Final   05/03/2023 4.09 (L) 4.14 - 5.80 10*6/mm3 Final   07/24/2018 4.30 (L) 4.5 - 5.9 10*6/uL Final     Hemoglobin   Date Value Ref Range Status   02/16/2024 12.6 (L) 13.0 - 17.7 g/dL Final   07/24/2018 14.1 13.5 - 17.5 g/dL Final     Hematocrit   Date Value Ref Range Status   02/16/2024 37.1 (L) 37.5 - 51.0 % Final   07/24/2018 40.5 (L) 41.0 - 53.0 % Final     Platelets   Date Value Ref Range Status   02/16/2024 225 140 - 450 10*3/mm3 Final   07/24/2018 199 140 - 440 10*3/uL Final        Lab Results   Component Value Date    NEUTROABS 2.38 02/16/2024        Lab Results   Component Value Date    GLUCOSE 99 02/16/2024    BUN 19 02/16/2024    CREATININE 1.09 02/16/2024    EGFRRESULT 78.8 05/03/2023    EGFR 74.9 02/16/2024    BCR 17.4 02/16/2024    K 4.5 02/16/2024    CO2 28.4 02/16/2024    CALCIUM 9.6 02/16/2024    PROTENTOTREF 6.7 02/16/2024    ALBUMIN 3.7 02/16/2024    ALBUMIN 4.5 02/16/2024    BILITOT 1.1 02/16/2024    AST 37 02/16/2024    ALT 39 02/16/2024     Lab Results   Component Value Date    IRON 93 12/20/2023    TIBC 331 12/20/2023    FERRITIN 244.00 12/20/2023     Lab Results   Component Value Date    [id number] 422 12/20/2023     Lab Results   Component Value Date    FOLATE 6.73 12/20/2023          SERUM RITA + PE 2/16/2024  IgG  603 - 1613 mg/dL 1023  1098       IgA  61 - 437 mg/dL 112  115       IgM  20 - 172 mg/dL 204 High   206 High        Total Protein  6.0 - 8.5 g/dL 6.7 7.2 6.7 6.1 7.1 7.1 7.0   Albumin  2.9 - 4.4 g/dL 3.7 4.5 R 3.8 4.1 R 4.8 R 4.80 R 4.50 R   Alpha-1-Globulin  0.0 - 0.4 g/dL 0.2  0.2       Alpha-2-Globulin  0.4 - 1.0 g/dL 0.7  0.7       Beta Globulin  0.7 - 1.3 g/dL 0.8  0.8       Gamma Globulin  0.4 - 1.8 g/dL 1.2  1.2       M-Chris  Not Observed g/dL Not Observed  Not Observed       Globulin  2.2 - 3.9 g/dL 3.0  2.9  2.3 R 2.3 R    A/G Ratio  0.7 - 1.7 1.3 1.7 R 1.4 2.1 R 2.1 R 2.1 R 1.8 R   Immunofixation Reflex, Serum Comment  Comment CM       Comment: No monoclonality detected.     Free Light Chain, Kappa  3.3 - 19.4 mg/L 23.2 High          Free Lambda Light Chains  5.7 - 26.3 mg/L 14.7         Kappa/Lambda Ratio  0.26 - 1.65 1.58             24 Hr URINE LIGHT CHAINS  Component  Ref Range & Units 2 wk ago   Free Kappa Lt Chains,Ur  1.17 - 86.46 mg/L 9.86   Free [place] Lt Chains, 24hr  Undefined mg/24 hr 18.54   Free Lambda Lt Chains,Ur  0.27 - 15.21 mg/L 0.70   Free Lambda Lt Chains, 24 Hr  Undefined mg/24 hr 1.32   Kappa/Lambda Ratio,U  1.83 - 14.26 14.09         IMAGING:  SKELETAL BONE SURVEY 2/16/2024   HISTORY: Monoclonal  gammopathy. Evaluate for lytic lesions.     FINDINGS:  The examination consists of 25 images. There are no  suspicious lytic lesions throughout the skeleton. There are no  compression fractures in the spine.     CONCLUSION: Negative skeletal bone survey.      CT ABDOMEN PELVIS WO CONTRAST 12/1/2023  IMPRESSION:  Impression:     1. No acute CT abnormalities in the abdomen and pelvis     2. Colon diverticulosis with no evidence of acute diverticulitis      Assessment & Plan   1.  Free kappa light chains noted on the random urine immunofixation.  As noted above on his 24-hour urine collection there was no abnormality of his urine light chains noted.  2.  Mild anemia with no evidence of deficiency state or obvious bleeding.  3.  History of microscopic hematuria previously evaluated by urology with no abnormal findings.  CT scan of the abdomen pelvis showed no evidence of renal masses.    PLAN  1.  I reviewed the lab results and bone survey results with the patient and provided in printed copies of the reports.  I reassured him that we do not see any worrisome findings and at this time I do not feel he needs any additional workup.  2.  We have not scheduled routine follow-up in our office but certainly we would be happy to see Kuldeep again anytime in the future if new concerns arise.    Thanks for allowing us to see this nice gentleman in consultation

## 2024-03-26 ENCOUNTER — OFFICE VISIT (OUTPATIENT)
Dept: FAMILY MEDICINE CLINIC | Facility: CLINIC | Age: 69
End: 2024-03-26
Payer: COMMERCIAL

## 2024-03-26 ENCOUNTER — PATIENT ROUNDING (BHMG ONLY) (OUTPATIENT)
Dept: FAMILY MEDICINE CLINIC | Facility: CLINIC | Age: 69
End: 2024-03-26

## 2024-03-26 VITALS
HEART RATE: 83 BPM | WEIGHT: 233 LBS | RESPIRATION RATE: 12 BRPM | BODY MASS INDEX: 33.36 KG/M2 | OXYGEN SATURATION: 98 % | SYSTOLIC BLOOD PRESSURE: 131 MMHG | HEIGHT: 70 IN | TEMPERATURE: 97.7 F | DIASTOLIC BLOOD PRESSURE: 76 MMHG

## 2024-03-26 DIAGNOSIS — Z13.220 ENCOUNTER FOR SCREENING FOR LIPID DISORDER: ICD-10-CM

## 2024-03-26 DIAGNOSIS — G47.33 OSA (OBSTRUCTIVE SLEEP APNEA): ICD-10-CM

## 2024-03-26 DIAGNOSIS — Z86.39 HISTORY OF HYPOTHYROIDISM AS A CHILD: ICD-10-CM

## 2024-03-26 DIAGNOSIS — Z12.5 PROSTATE CANCER SCREENING: ICD-10-CM

## 2024-03-26 DIAGNOSIS — R73.9 HYPERGLYCEMIA: ICD-10-CM

## 2024-03-26 DIAGNOSIS — D64.9 ANEMIA, UNSPECIFIED TYPE: ICD-10-CM

## 2024-03-26 DIAGNOSIS — I10 PRIMARY HYPERTENSION: Primary | ICD-10-CM

## 2024-03-26 DIAGNOSIS — K21.9 GASTROESOPHAGEAL REFLUX DISEASE WITHOUT ESOPHAGITIS: ICD-10-CM

## 2024-03-26 RX ORDER — IBUPROFEN 200 MG
200 TABLET ORAL EVERY 6 HOURS PRN
COMMUNITY

## 2024-03-26 RX ORDER — LORATADINE 10 MG/1
10 TABLET ORAL DAILY PRN
Start: 2024-03-26

## 2024-03-26 RX ORDER — LISINOPRIL 20 MG/1
20 TABLET ORAL DAILY
Qty: 90 TABLET | Refills: 1 | Status: SHIPPED | OUTPATIENT
Start: 2024-03-26

## 2024-03-26 NOTE — PROGRESS NOTES
Subjective   Hiram Echevarria Jr. is a 68 y.o. male.     Chief Complaint   Patient presents with    [facility]    Hypertension         Current Outpatient Medications:     ibuprofen (ADVIL,MOTRIN) 200 MG tablet, Take 1 tablet by mouth Every 6 (Six) Hours As Needed for Mild Pain., Disp: , Rfl:     lisinopril (PRINIVIL,ZESTRIL) 20 MG tablet, Take 1 tablet by mouth Daily. For blood pressure, Disp: 90 tablet, Rfl: 1    esomeprazole (nexIUM) 20 MG capsule, Take 1 capsule by mouth Daily., Disp: , Rfl:     loratadine (Claritin) 10 MG tablet, Take 1 tablet by mouth Daily As Needed for Allergies., Disp: , Rfl:     Past Medical History:   Diagnosis Date    Asymptomatic microscopic hematuria 03/2018    negative  workkup    Bruit     LEFT CAROTID ARTERY    Colon polyps     FOLLOWED BY DR. SCARLET KING    GERD     Hypertension     Neutropenia     OA (osteoarthritis)     DOREEN / BiPAP     Wears glasses        Past Surgical History:   Procedure Laterality Date    CARPAL TUNNEL RELEASE Left 1998    CARPAL TUNNEL RELEASE Right 08/27/1999    DR. EDVIN ANDRADE AT [place]    CATARACT EXTRACTION, BILATERAL  2024    COLONOSCOPY N/A 12/06/2018    (3) 5 MM SESSILE TUBULAR ADENOMA POLYPS, DIVERTICULOSIS, RESCOPE IN 3 YRS, DR. SCARLET KING AT [facility]    COLONOSCOPY N/A 03/23/2022    5 MM TUBULAR ADENOMA POLYP IN TRANSVERSE, RESCOPE IN 5 YRS, DR. SCARLET KING AT [facility]    COLONOSCOPY      2013/ 2018/ 2022= TA, rech 2027   Dr King    COLONOSCOPY W/ POLYPECTOMY N/A 12/18/2013    5 MM ADENOMATOUS POLYP IN TRANSVERSE, DIVERTICULOSIS IN SIGMOID, RESCOPE IN 5 YRS, DR. SCARLET KING AT [facility]    KNEE ARTHROSCOPY Right 06/21/2005    DR. TARUN JURADO AT [place]    KNEE MENISCAL REPAIR Left 08/13/2018    DR. SERGIO ANDREW    LUMBAR DISC SURGERY Left 2002    L5-S1     TONSILLECTOMY Bilateral     TOTAL SHOULDER ARTHROPLASTY Right 09/30/2014    DR. SERGIO ANDREW AT [place]       Family History   Problem Relation Age of Onset    Hypertension Mother     Diabetes Mother      Hypertension Father     Diabetes Father     Stroke Father     Heart disease Father     Diabetes Sister     Kidney failure Sister     Obesity Sister     Hypertension Sister     Diabetes Sister     Kidney disease Sister     Colon cancer Neg Hx     Prostate cancer Neg Hx     Malig Hyperthermia Neg Hx        Social History     Socioeconomic History    Marital status:    Tobacco Use    Smoking status: Never     Passive exposure: Never    Smokeless tobacco: Former     Types: Chew     Quit date: 6/15/2011    Tobacco comments:     Daily x 30yrs   Vaping Use    Vaping status: Never Used   Substance and Sexual Activity    Alcohol use: Yes     Alcohol/week: 4.0 standard drinks of alcohol     Types: 4 Shots of liquor per week    Drug use: No    Sexual activity: Yes     Partners: Female     Birth control/protection: Post-menopausal       Hypertension  Pertinent negatives include no chest pain, palpitations or shortness of breath.      The patient is a 68-year-old male who is here as a new patient for hypertension/ DOREEN/ OA/ GERD.    The patient is doing well.    He has a blood pressure of 131/76 mmHg today.    He has had a cough since before he started taking lisinopril. He got sick in 12/2023. He occasionally has sinus drainage. His cough has eased up some, but over the last few days, it has become heavier. He has both loose and dry cough. He takes AllerClear, which works well for him. It does not bother his ability to urinate. He takes over-the-counter Nexium 20 mg 1 tablet daily.    He saw Dr. Roman, hematologist, a few weeks ago and everything was normal. He had blood work in 12/2023. He loves to eat food. He eats 1 meal a day, which is keto, with a lot of salads with meat or protein on it. His weight fluctuates. He has not been walking as much because of the weather. He hikes and rides bikes. He takes a vitamin when he thinks about it. He had a lack of thyroid when he was younger and was on thyroid medication.    He  was sick on 2023 and went to urgent care. They could not pinpoint anything. He went in with pain in his side, which felt like appendicitis. He still has intermittent pain.    He has sleep apnea and uses a BiPAP. He just ordered supplies for his sleep apnea. He has not seen Dr. King in a couple of years. He has arthritis and heartburn. He still has left carotid bruit. He had a right total shoulder replacement, tonsillectomy, lumbar disc surgery, left knee meniscal repair, and right knee scope. He had bilateral carpal tunnel surgery. His last colonoscopy was in  and found a tubular adenoma in the transverse colon. He is on a 5 year schedule with Dr. Sandra King. He sees Dr. Waldemar Max at [facility]. He had a cataract extraction in 2025 and 2025.    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 He drinks alcohol ----4 a week on average. He denies any drug use.    His mother passed away at 66-year-old. She had diabetes and blood pressure. His father had diabetes, blood pressure, heart disease, and stroke. He passed away at 63 years old. He has 3 sisters, 2 living and 1 . One of his sisters passed away had diabetes, kidney failure, and obesity. She passed away at 93 years old in 2020. His 2 daughters are still living. His oldest sister is 77-years-old and has stage 3 kidney disease. They are both obese. He does not have any brothers.      He has received 1 shingles vaccine.                                                                        The following portions of the patient's history were reviewed and updated as appropriate: allergies, current medications, past family history, past medical history, past social history, past surgical history and problem list.    Review of Systems   Constitutional:  Negative for activity change, appetite change, fatigue, unexpected weight gain and unexpected weight loss.   Respiratory:  Positive for cough. Negative for chest  tightness, shortness of breath and wheezing.    Cardiovascular:  Negative for chest pain, palpitations and leg swelling.   Genitourinary:  Negative for frequency, urgency and urinary incontinence.   Musculoskeletal:  Negative for arthralgias and myalgias.   Neurological:  Negative for dizziness, facial asymmetry, speech difficulty, weakness, light-headedness, headache, memory problem and confusion.       Vitals:    03/26/24 1357   BP: 131/76   Pulse: 83   Resp: 12   Temp: 97.7 °F (36.5 °C)   SpO2: 98%       Objective   Physical Exam  Vitals and nursing note reviewed.   Constitutional:       General: He is not in acute distress.     Appearance: He is well-developed. He is not ill-appearing or toxic-appearing.   HENT:      Head: Normocephalic and atraumatic.   Cardiovascular:      Rate and Rhythm: Normal rate and regular rhythm.      Heart sounds: Normal heart sounds. No murmur heard.  Pulmonary:      Effort: Pulmonary effort is normal. No respiratory distress.      Breath sounds: Normal breath sounds. No wheezing, rhonchi or rales.   Musculoskeletal:      Cervical back: Normal range of motion and neck supple.   Skin:     General: Skin is warm and dry.      Findings: No rash.   Neurological:      Mental Status: He is alert and oriented to person, place, and time.      Cranial Nerves: No cranial nerve deficit.      Gait: Gait normal.   Psychiatric:         Attention and Perception: Attention and perception normal.         Mood and Affect: Mood and affect normal.         Speech: Speech normal.         Behavior: Behavior normal. Behavior is cooperative.         Thought Content: Thought content normal.         Cognition and Memory: Cognition and memory normal.         Judgment: Judgment normal.         Assessment & Plan   Diagnoses and all orders for this visit:    1. Primary hypertension (Primary)    2. Gastroesophageal reflux disease without esophagitis    3. Hyperglycemia  -     Hemoglobin A1c; Future  -      MicroAlbumin, Urine, Random - Urine, Clean Catch; Future    4. Anemia, unspecified type  -     CBC & Differential; Future    5. DOREEN (obstructive sleep apnea)    6. Encounter for screening for lipid disorder  -     Lipid Panel; Future    7. Prostate cancer screening  -     PSA Screen; Future    8. History of hypothyroidism as a child  -     T4, Free; Future  -     TSH; Future    Other orders  -     loratadine (Claritin) 10 MG tablet; Take 1 tablet by mouth Daily As Needed for Allergies.  -     esomeprazole (nexIUM) 20 MG capsule; Take 1 capsule by mouth Daily.  -     lisinopril (PRINIVIL,ZESTRIL) 20 MG tablet; Take 1 tablet by mouth Daily. For blood pressure  Dispense: 90 tablet; Refill: 1    1. Hypertension.  - His blood pressure with his cuff was too high. He will use his blood pressure machine today.    2. Cough.  - We discussed that lisinopril can cause a dry tickle cough. He can take antihistamines for drainage. He can take plain Claritin.    3. Prediabetes.  - His hemoglobin A1c was elevated in 12/2023. He was advised to cut back on sugar in his drinks, soda, sweet tea, and desserts.    4. Health maintenance.  - He is due for a PSA in 05/2024. He will get his second shingles vaccine at the pharmacy. He can get the RSV vaccine at the pharmacy. I will check his thyroid.    He can take ibuprofen.    Follow-up  The patient will follow up in 05/2024.               Transcribed from ambient dictation for Selene Blackwell DO by Lisa Renteria.  03/26/24   15:01 EDT    Patient or patient representative verbalized consent to the visit recording.  I have personally performed the services described in this document as transcribed by the above individual, and it is both accurate and complete.

## 2024-05-17 ENCOUNTER — TELEPHONE (OUTPATIENT)
Dept: FAMILY MEDICINE CLINIC | Facility: CLINIC | Age: 69
End: 2024-05-17
Payer: COMMERCIAL

## 2024-05-17 NOTE — TELEPHONE ENCOUNTER
"    Caller: Hiram Echevarria Jr. \"Kuldeep\"    Relationship: Self    Best call back number: [phone]     What orders are you requesting (i.e. lab or imaging): LABS    In what timeframe would the patient need to come in: ANY    Where will you receive your lab/imaging services: IN OFFICE    Additional notes: PATIENT REQUESTS CALL BACK TO SCHEDULE LABS.  "

## 2024-05-23 ENCOUNTER — CLINICAL SUPPORT (OUTPATIENT)
Dept: FAMILY MEDICINE CLINIC | Facility: CLINIC | Age: 69
End: 2024-05-23
Payer: COMMERCIAL

## 2024-05-23 DIAGNOSIS — Z13.220 ENCOUNTER FOR SCREENING FOR LIPID DISORDER: ICD-10-CM

## 2024-05-23 DIAGNOSIS — R73.9 HYPERGLYCEMIA: ICD-10-CM

## 2024-05-23 DIAGNOSIS — Z86.39 HISTORY OF HYPOTHYROIDISM AS A CHILD: ICD-10-CM

## 2024-05-23 DIAGNOSIS — Z12.5 PROSTATE CANCER SCREENING: ICD-10-CM

## 2024-05-23 LAB
CHOLEST SERPL-MCNC: 183 MG/DL (ref 0–200)
HBA1C MFR BLD: 5.7 % (ref 4.8–5.6)
HDLC SERPL-MCNC: 50 MG/DL (ref 40–60)
LDLC SERPL CALC-MCNC: 122 MG/DL (ref 0–100)
LDLC/HDLC SERPL: 2.44 {RATIO}
TRIGL SERPL-MCNC: 55 MG/DL (ref 0–150)
VLDLC SERPL-MCNC: 11 MG/DL (ref 5–40)

## 2024-05-23 PROCEDURE — 83036 HEMOGLOBIN GLYCOSYLATED A1C: CPT | Performed by: FAMILY MEDICINE

## 2024-05-23 PROCEDURE — 36415 COLL VENOUS BLD VENIPUNCTURE: CPT | Performed by: FAMILY MEDICINE

## 2024-05-23 PROCEDURE — G0103 PSA SCREENING: HCPCS | Performed by: FAMILY MEDICINE

## 2024-05-23 PROCEDURE — 84439 ASSAY OF FREE THYROXINE: CPT | Performed by: FAMILY MEDICINE

## 2024-05-23 PROCEDURE — 84443 ASSAY THYROID STIM HORMONE: CPT | Performed by: FAMILY MEDICINE

## 2024-05-23 PROCEDURE — 82043 UR ALBUMIN QUANTITATIVE: CPT | Performed by: FAMILY MEDICINE

## 2024-05-23 PROCEDURE — 80061 LIPID PANEL: CPT | Performed by: FAMILY MEDICINE

## 2024-05-23 NOTE — PROGRESS NOTES
Venipuncture performed in right arm by Ale Armenta CMA  with good hemostasis. Patient tolerated well. 05/23/24 Ale Armenta CMA     [facility]

## 2024-05-24 LAB
ALBUMIN UR-MCNC: <1.2 MG/DL
PSA SERPL-MCNC: 0.52 NG/ML (ref 0–4)
T4 FREE SERPL-MCNC: 1.17 NG/DL (ref 0.93–1.7)
TSH SERPL DL<=0.05 MIU/L-ACNC: 1.51 UIU/ML (ref 0.27–4.2)

## 2024-05-31 ENCOUNTER — TELEPHONE (OUTPATIENT)
Dept: FAMILY MEDICINE CLINIC | Facility: CLINIC | Age: 69
End: 2024-05-31
Payer: COMMERCIAL

## 2024-05-31 NOTE — TELEPHONE ENCOUNTER
"Left voice message stating patient is due for an annual wellness visit.    Relay     \"Please schedule for a Sub medicare wellness\"    "

## 2024-06-13 ENCOUNTER — TELEPHONE (OUTPATIENT)
Dept: FAMILY MEDICINE CLINIC | Facility: CLINIC | Age: 69
End: 2024-06-13
Payer: COMMERCIAL

## 2024-06-13 NOTE — TELEPHONE ENCOUNTER
HUB TO RELAY     CALLED PT TO SCHEDULE THEIR ANNUAL MEDICARE WELLNESS VISIT.      LEFT VOICEMAIL INFORMING PT AND SENT [employer] MESSAGE.

## 2024-08-22 ENCOUNTER — PATIENT OUTREACH (OUTPATIENT)
Dept: FAMILY MEDICINE CLINIC | Facility: CLINIC | Age: 69
End: 2024-08-22
Payer: COMMERCIAL

## 2024-08-29 ENCOUNTER — PATIENT ROUNDING (BHMG ONLY) (OUTPATIENT)
Dept: FAMILY MEDICINE CLINIC | Facility: CLINIC | Age: 69
End: 2024-08-29
Payer: COMMERCIAL

## 2024-08-29 ENCOUNTER — OFFICE VISIT (OUTPATIENT)
Dept: FAMILY MEDICINE CLINIC | Facility: CLINIC | Age: 69
End: 2024-08-29
Payer: COMMERCIAL

## 2024-08-29 VITALS
TEMPERATURE: 98 F | SYSTOLIC BLOOD PRESSURE: 130 MMHG | DIASTOLIC BLOOD PRESSURE: 77 MMHG | BODY MASS INDEX: 34.5 KG/M2 | HEART RATE: 61 BPM | WEIGHT: 241 LBS | RESPIRATION RATE: 14 BRPM | HEIGHT: 70 IN | OXYGEN SATURATION: 97 %

## 2024-08-29 DIAGNOSIS — R73.9 HYPERGLYCEMIA: ICD-10-CM

## 2024-08-29 DIAGNOSIS — I10 PRIMARY HYPERTENSION: ICD-10-CM

## 2024-08-29 DIAGNOSIS — G47.33 OSA (OBSTRUCTIVE SLEEP APNEA): ICD-10-CM

## 2024-08-29 DIAGNOSIS — Z00.00 MEDICARE ANNUAL WELLNESS VISIT, SUBSEQUENT: Primary | ICD-10-CM

## 2024-08-29 DIAGNOSIS — K21.9 GASTROESOPHAGEAL REFLUX DISEASE WITHOUT ESOPHAGITIS: ICD-10-CM

## 2024-08-29 RX ORDER — LISINOPRIL 20 MG/1
20 TABLET ORAL DAILY
Qty: 90 TABLET | Refills: 2 | Status: SHIPPED | OUTPATIENT
Start: 2024-08-29

## 2025-01-17 ENCOUNTER — CLINICAL SUPPORT (OUTPATIENT)
Dept: FAMILY MEDICINE CLINIC | Facility: CLINIC | Age: 70
End: 2025-01-17
Payer: COMMERCIAL

## 2025-01-17 VITALS — HEART RATE: 68 BPM | SYSTOLIC BLOOD PRESSURE: 132 MMHG | DIASTOLIC BLOOD PRESSURE: 78 MMHG

## 2025-02-28 ENCOUNTER — CLINICAL SUPPORT (OUTPATIENT)
Dept: FAMILY MEDICINE CLINIC | Facility: CLINIC | Age: 70
End: 2025-02-28
Payer: COMMERCIAL

## 2025-02-28 DIAGNOSIS — R73.9 BLOOD GLUCOSE ELEVATED: Primary | ICD-10-CM

## 2025-02-28 LAB
EXPIRATION DATE: ABNORMAL
HBA1C MFR BLD: 5.7 % (ref 4.5–5.7)
Lab: ABNORMAL

## 2025-02-28 NOTE — PROGRESS NOTES
Fingerstick performed in L -3rd finger by RT Tan  with good hemostasis. Patient tolerated well. 02/28/25 Anya Sol, RT

## 2025-07-31 RX ORDER — LISINOPRIL 20 MG/1
20 TABLET ORAL DAILY
Qty: 90 TABLET | Refills: 0 | Status: SHIPPED | OUTPATIENT
Start: 2025-07-31

## 2025-07-31 NOTE — TELEPHONE ENCOUNTER
Rx Refill Note  Requested Prescriptions     Pending Prescriptions Disp Refills    lisinopril (PRINIVIL,ZESTRIL) 20 MG tablet 90 tablet 2     Sig: Take 1 tablet by mouth Daily. For blood pressure      Last office visit with prescribing clinician: 8/29/2024   Last telemedicine visit with prescribing clinician: Visit date not found   Next office visit with prescribing clinician: Visit date not found        Lipid Panel (05/23/2024 09:38)                  Would you like a call back once the refill request has been completed: [] Yes [] No    If the office needs to give you a call back, can they leave a voicemail: [] Yes [] No    Anya Sol, RT  07/31/25, 08:05 EDT   CT  ( I personally review) noted Large amount of stool in colon  Continue senna 2 tab hs  Change miralax to bid  One dose of fleet enema

## (undated) DEVICE — ADAPT CLN BIOGUARD AIR/H2O DISP

## (undated) DEVICE — TUBING, SUCTION, 1/4" X 10', STRAIGHT: Brand: MEDLINE

## (undated) DEVICE — CANN O2 ETCO2 FITS ALL CONN CO2 SMPL A/ 7IN DISP LF

## (undated) DEVICE — SINGLE-USE BIOPSY FORCEPS: Brand: RADIAL JAW 4

## (undated) DEVICE — LN SMPL CO2 SHTRM SD STREAM W/M LUER

## (undated) DEVICE — SENSR O2 OXIMAX FNGR A/ 18IN NONSTR

## (undated) DEVICE — Device: Brand: DEFENDO AIR/WATER/SUCTION AND BIOPSY VALVE

## (undated) DEVICE — CANNULA,ADULT,SOFT-TOUCH,7'TUBE,UC: Brand: PENDING

## (undated) DEVICE — THE TORRENT IRRIGATION SCOPE CONNECTOR IS USED WITH THE TORRENT IRRIGATION TUBING TO PROVIDE IRRIGATION FLUIDS SUCH AS STERILE WATER DURING GASTROINTESTINAL ENDOSCOPIC PROCEDURES WHEN USED IN CONJUNCTION WITH AN IRRIGATION PUMP (OR ELECTROSURGICAL UNIT).: Brand: TORRENT

## (undated) DEVICE — KT ORCA ORCAPOD DISP STRL